# Patient Record
Sex: FEMALE | Race: WHITE | NOT HISPANIC OR LATINO | URBAN - METROPOLITAN AREA
[De-identification: names, ages, dates, MRNs, and addresses within clinical notes are randomized per-mention and may not be internally consistent; named-entity substitution may affect disease eponyms.]

---

## 2017-05-31 ENCOUNTER — OUTPATIENT (OUTPATIENT)
Dept: OUTPATIENT SERVICES | Facility: HOSPITAL | Age: 22
LOS: 1 days | Discharge: HOME | End: 2017-05-31

## 2017-06-28 DIAGNOSIS — E04.9 NONTOXIC GOITER, UNSPECIFIED: ICD-10-CM

## 2019-08-05 ENCOUNTER — EMERGENCY (EMERGENCY)
Facility: HOSPITAL | Age: 24
LOS: 0 days | Discharge: HOME | End: 2019-08-05
Attending: EMERGENCY MEDICINE | Admitting: EMERGENCY MEDICINE
Payer: COMMERCIAL

## 2019-08-05 VITALS
SYSTOLIC BLOOD PRESSURE: 131 MMHG | RESPIRATION RATE: 18 BRPM | HEART RATE: 117 BPM | TEMPERATURE: 98 F | OXYGEN SATURATION: 100 % | DIASTOLIC BLOOD PRESSURE: 94 MMHG | WEIGHT: 169.98 LBS

## 2019-08-05 VITALS
OXYGEN SATURATION: 100 % | SYSTOLIC BLOOD PRESSURE: 122 MMHG | DIASTOLIC BLOOD PRESSURE: 85 MMHG | RESPIRATION RATE: 17 BRPM | HEART RATE: 91 BPM

## 2019-08-05 DIAGNOSIS — Z88.8 ALLERGY STATUS TO OTHER DRUGS, MEDICAMENTS AND BIOLOGICAL SUBSTANCES STATUS: ICD-10-CM

## 2019-08-05 DIAGNOSIS — R10.9 UNSPECIFIED ABDOMINAL PAIN: ICD-10-CM

## 2019-08-05 DIAGNOSIS — R11.0 NAUSEA: ICD-10-CM

## 2019-08-05 DIAGNOSIS — Z88.1 ALLERGY STATUS TO OTHER ANTIBIOTIC AGENTS STATUS: ICD-10-CM

## 2019-08-05 DIAGNOSIS — R10.30 LOWER ABDOMINAL PAIN, UNSPECIFIED: ICD-10-CM

## 2019-08-05 LAB
ALBUMIN SERPL ELPH-MCNC: 4.9 G/DL — SIGNIFICANT CHANGE UP (ref 3.5–5.2)
ALP SERPL-CCNC: 46 U/L — SIGNIFICANT CHANGE UP (ref 30–115)
ALT FLD-CCNC: 14 U/L — SIGNIFICANT CHANGE UP (ref 0–41)
ANION GAP SERPL CALC-SCNC: 17 MMOL/L — HIGH (ref 7–14)
APPEARANCE UR: CLEAR — SIGNIFICANT CHANGE UP
AST SERPL-CCNC: 15 U/L — SIGNIFICANT CHANGE UP (ref 0–41)
BACTERIA # UR AUTO: ABNORMAL
BASOPHILS # BLD AUTO: 0.07 K/UL — SIGNIFICANT CHANGE UP (ref 0–0.2)
BASOPHILS NFR BLD AUTO: 1 % — SIGNIFICANT CHANGE UP (ref 0–1)
BILIRUB DIRECT SERPL-MCNC: <0.2 MG/DL — SIGNIFICANT CHANGE UP (ref 0–0.2)
BILIRUB INDIRECT FLD-MCNC: >0.4 MG/DL — SIGNIFICANT CHANGE UP (ref 0.2–1.2)
BILIRUB SERPL-MCNC: 0.6 MG/DL — SIGNIFICANT CHANGE UP (ref 0.2–1.2)
BILIRUB UR-MCNC: NEGATIVE — SIGNIFICANT CHANGE UP
BUN SERPL-MCNC: 11 MG/DL — SIGNIFICANT CHANGE UP (ref 10–20)
CALCIUM SERPL-MCNC: 10.5 MG/DL — HIGH (ref 8.5–10.1)
CHLORIDE SERPL-SCNC: 100 MMOL/L — SIGNIFICANT CHANGE UP (ref 98–110)
CO2 SERPL-SCNC: 22 MMOL/L — SIGNIFICANT CHANGE UP (ref 17–32)
COLOR SPEC: YELLOW — SIGNIFICANT CHANGE UP
CREAT SERPL-MCNC: 0.8 MG/DL — SIGNIFICANT CHANGE UP (ref 0.7–1.5)
DIFF PNL FLD: ABNORMAL
EOSINOPHIL # BLD AUTO: 0.18 K/UL — SIGNIFICANT CHANGE UP (ref 0–0.7)
EOSINOPHIL NFR BLD AUTO: 2.6 % — SIGNIFICANT CHANGE UP (ref 0–8)
EPI CELLS # UR: ABNORMAL /HPF
GLUCOSE SERPL-MCNC: 81 MG/DL — SIGNIFICANT CHANGE UP (ref 70–99)
GLUCOSE UR QL: NEGATIVE MG/DL — SIGNIFICANT CHANGE UP
HCT VFR BLD CALC: 43.8 % — SIGNIFICANT CHANGE UP (ref 37–47)
HGB BLD-MCNC: 14.6 G/DL — SIGNIFICANT CHANGE UP (ref 12–16)
IMM GRANULOCYTES NFR BLD AUTO: 0.1 % — SIGNIFICANT CHANGE UP (ref 0.1–0.3)
KETONES UR-MCNC: 160
LACTATE SERPL-SCNC: 1.3 MMOL/L — SIGNIFICANT CHANGE UP (ref 0.5–2.2)
LEUKOCYTE ESTERASE UR-ACNC: ABNORMAL
LIDOCAIN IGE QN: 62 U/L — HIGH (ref 7–60)
LYMPHOCYTES # BLD AUTO: 2.22 K/UL — SIGNIFICANT CHANGE UP (ref 1.2–3.4)
LYMPHOCYTES # BLD AUTO: 32.4 % — SIGNIFICANT CHANGE UP (ref 20.5–51.1)
MCHC RBC-ENTMCNC: 29.6 PG — SIGNIFICANT CHANGE UP (ref 27–31)
MCHC RBC-ENTMCNC: 33.3 G/DL — SIGNIFICANT CHANGE UP (ref 32–37)
MCV RBC AUTO: 88.7 FL — SIGNIFICANT CHANGE UP (ref 81–99)
MONOCYTES # BLD AUTO: 0.47 K/UL — SIGNIFICANT CHANGE UP (ref 0.1–0.6)
MONOCYTES NFR BLD AUTO: 6.9 % — SIGNIFICANT CHANGE UP (ref 1.7–9.3)
NEUTROPHILS # BLD AUTO: 3.91 K/UL — SIGNIFICANT CHANGE UP (ref 1.4–6.5)
NEUTROPHILS NFR BLD AUTO: 57 % — SIGNIFICANT CHANGE UP (ref 42.2–75.2)
NITRITE UR-MCNC: NEGATIVE — SIGNIFICANT CHANGE UP
NRBC # BLD: 0 /100 WBCS — SIGNIFICANT CHANGE UP (ref 0–0)
PH UR: 6 — SIGNIFICANT CHANGE UP (ref 5–8)
PLATELET # BLD AUTO: 305 K/UL — SIGNIFICANT CHANGE UP (ref 130–400)
POTASSIUM SERPL-MCNC: 4.3 MMOL/L — SIGNIFICANT CHANGE UP (ref 3.5–5)
POTASSIUM SERPL-SCNC: 4.3 MMOL/L — SIGNIFICANT CHANGE UP (ref 3.5–5)
PROT SERPL-MCNC: 7.5 G/DL — SIGNIFICANT CHANGE UP (ref 6–8)
PROT UR-MCNC: NEGATIVE MG/DL — SIGNIFICANT CHANGE UP
RBC # BLD: 4.94 M/UL — SIGNIFICANT CHANGE UP (ref 4.2–5.4)
RBC # FLD: 12.5 % — SIGNIFICANT CHANGE UP (ref 11.5–14.5)
SODIUM SERPL-SCNC: 139 MMOL/L — SIGNIFICANT CHANGE UP (ref 135–146)
SP GR SPEC: 1.01 — SIGNIFICANT CHANGE UP (ref 1.01–1.03)
UROBILINOGEN FLD QL: 0.2 MG/DL — SIGNIFICANT CHANGE UP (ref 0.2–0.2)
WBC # BLD: 6.86 K/UL — SIGNIFICANT CHANGE UP (ref 4.8–10.8)
WBC # FLD AUTO: 6.86 K/UL — SIGNIFICANT CHANGE UP (ref 4.8–10.8)
WBC UR QL: SIGNIFICANT CHANGE UP /HPF

## 2019-08-05 PROCEDURE — 99283 EMERGENCY DEPT VISIT LOW MDM: CPT

## 2019-08-05 PROCEDURE — 76830 TRANSVAGINAL US NON-OB: CPT | Mod: 26

## 2019-08-05 PROCEDURE — 99284 EMERGENCY DEPT VISIT MOD MDM: CPT

## 2019-08-05 PROCEDURE — 74177 CT ABD & PELVIS W/CONTRAST: CPT | Mod: 26

## 2019-08-05 RX ORDER — ONDANSETRON 8 MG/1
4 TABLET, FILM COATED ORAL ONCE
Refills: 0 | Status: COMPLETED | OUTPATIENT
Start: 2019-08-05 | End: 2019-08-05

## 2019-08-05 RX ORDER — IOHEXOL 300 MG/ML
30 INJECTION, SOLUTION INTRAVENOUS ONCE
Refills: 0 | Status: COMPLETED | OUTPATIENT
Start: 2019-08-05 | End: 2019-08-05

## 2019-08-05 RX ORDER — SODIUM CHLORIDE 9 MG/ML
1000 INJECTION, SOLUTION INTRAVENOUS ONCE
Refills: 0 | Status: COMPLETED | OUTPATIENT
Start: 2019-08-05 | End: 2019-08-05

## 2019-08-05 RX ADMIN — SODIUM CHLORIDE 1000 MILLILITER(S): 9 INJECTION, SOLUTION INTRAVENOUS at 15:02

## 2019-08-05 RX ADMIN — ONDANSETRON 4 MILLIGRAM(S): 8 TABLET, FILM COATED ORAL at 15:02

## 2019-08-05 RX ADMIN — IOHEXOL 30 MILLILITER(S): 300 INJECTION, SOLUTION INTRAVENOUS at 15:03

## 2019-08-05 NOTE — CONSULT NOTE ADULT - ATTENDING COMMENTS
Patient seen and examined with surgery team in ED and discussed management plans with patient and her mother by bedside. sono and CT reviewed and patient advised to go home . does not require hospitalization for observation now as no change for last 3 days

## 2019-08-05 NOTE — ED PROVIDER NOTE - PHYSICAL EXAMINATION
VITAL SIGNS: I have reviewed nursing notes and confirm.  CONSTITUTIONAL: Well-developed; well-nourished; in no acute distress.  SKIN: Skin exam is warm and dry, no acute rash.  HEAD: Normocephalic; atraumatic.  EYES: PERRL, EOM intact; conjunctiva and sclera clear.  ENT: No nasal discharge; airway clear.   NECK: Supple; non tender.  CARD: S1, S2 normal; no murmurs, gallops, or rubs. Regular rate and rhythm.  RESP: Clear to auscultation bilaterally. No wheezes, rales or rhonchi.  ABD: Normal bowel sounds; soft; non-distended; +lower abdominal tenderness. No rebound tenderness or guarding.   EXT: Normal ROM. No edema.  LYMPH: No acute cervical adenopathy.  NEURO: Alert, oriented. Grossly unremarkable. No focal deficits.  PSYCH: Cooperative, appropriate.

## 2019-08-05 NOTE — ED PROVIDER NOTE - OBJECTIVE STATEMENT
25 yo F with pmhx of insulin resistance, PCOS on OCP use presenting with intermittent abdominal pain x 3 days. States pain worse at its worse on Saturday describes it as a feeling as if she ate lot. Symptoms are moderate. Better with rest and worse with movement. Reports associated nausea but no vomiting. Last sexually active last year. No vaginal discharge. States she has not gotten a period since 2 yo ago due to being on oral contraceptives. Reports some vaginal spotting on Sunday. No cp, sob, fever, chills, vomiting, diarrhea, back pain, urinary symptoms, headache, dizziness, paresthesias, or weakness. No prior abdominal surgeries.

## 2019-08-05 NOTE — ED PROVIDER NOTE - PROGRESS NOTE DETAILS
I was directly involved in the management of this patient. Case was discussed with PA fellow and I agree with fellow's plan. ATTENDING NOTE:   25 y/o F p/w RLQ x 2 days.  (+)RLQ TTP   CT labs reassess. Surgery consulted. Pt seen by surgery resident - awaiting Dr. Gant. Patient seen by surgeon Dr. Gant bedside recommending dc home with no antibiotics required and Gyn follow up.

## 2019-08-05 NOTE — ED PROVIDER NOTE - NS ED ROS FT
Review of Systems:  	•	CONSTITUTIONAL - no fever, no diaphoresis, no chills  	•	SKIN - no rash  	•	HEMATOLOGIC - no bleeding, no bruising  	•	EYES - no eye pain, no blurry vision  	•	ENT - no congestion  	•	RESPIRATORY - no shortness of breath, no cough  	•	CARDIAC - no chest pain, no palpitations  	•	GI - + abd pain, + nausea, no vomiting, no diarrhea, no constipation  	•	GENITO-URINARY/GYN - +vaginal spotting, no dysuria; no hematuria, no increased urinary frequency  	•	MUSCULOSKELETAL - no joint paint, no swelling, no redness  	•	NEUROLOGIC - no weakness, no headache, no paresthesias, no LOC  	•	PSYCH - no anxiety, non suicidal, non homicidal, no hallucination, no depression  	All other ROS are negative except as documented in HPI.

## 2019-08-05 NOTE — CONSULT NOTE ADULT - SUBJECTIVE AND OBJECTIVE BOX
General Surgery Consultation Note  =====================================================  HPI: 24y Female PMH hypercoaguability, PCOS, and below complains of waking with abdominal discomfort 8/3, across her lower abdomen with a bloating sensation and mild nausea, with T 99.           PAST MEDICAL & SURGICAL HISTORY:  heart murmur  insulin resistance  PCOS on progesterone only OCP  ? 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  [ ] Due to altered mental status/intubation, subjective information were not able to be obtained from the patient. History was obtained, to the extent possible, from review of the chart and collateral sources of information.      CURRENT MEDICATIONS:   --------------------------------------------------------------------------------------  Neurologic Medications    Respiratory Medications    Cardiovascular Medications    Gastrointestinal Medications    Genitourinary Medications    Hematologic/Oncologic Medications    Antimicrobial/Immunologic Medications    Endocrine/Metabolic Medications    Topical/Other Medications    --------------------------------------------------------------------------------------    VITAL SIGNS, INS/OUTS (last 24 hours):  --------------------------------------------------------------------------------------  ICU Vital Signs Last 24 Hrs  T(C): 36.7 (05 Aug 2019 13:16), Max: 36.7 (05 Aug 2019 13:16)  T(F): 98 (05 Aug 2019 13:16), Max: 98 (05 Aug 2019 13:16)  HR: 117 (05 Aug 2019 13:16) (117 - 117)  BP: 131/94 (05 Aug 2019 13:16) (131/94 - 131/94)  BP(mean): --  ABP: --  ABP(mean): --  RR: 18 (05 Aug 2019 13:16) (18 - 18)  SpO2: 100% (05 Aug 2019 13:16) (100% - 100%)    I&O's Summary    --------------------------------------------------------------------------------------  PHYSICAL EXAM    General: NAD, AAOx3, calm and cooperative  HEENT: NCAT, CEDRIC, EOMI, Trachea ML, Neck supple  Cardiac: RRR S1, S2, no Murmurs, rubs or gallops  Respiratory: CTAB, normal respiratory effort, breath sounds equal BL, no wheeze, rhonchi or crackles  Abdomen: Soft, non-distended, non-tender, +bowel sounds  Musculoskeletal: Strength 5/5 BL UE/LE, ROM intact, compartments soft  Neuro: Sensation grossly intact and equal throughout, CN II-XII intact, no focal deficits  Vascular: Pulses 2+ throughout, extremities well perfused  Skin: Warm/dry, normal color, no jaundice  Incision/wound: healing well, dressings in place, clean, dry and intact    LABS  --------------------------------------------------------------------------------------  Labs:  CAPILLARY BLOOD GLUCOSE                              14.6   6.86  )-----------( 305      ( 05 Aug 2019 13:50 )             43.8       Auto Neutrophil %: 57.0 % (19 @ 13:50)  Auto Immature Granulocyte %: 0.1 % (19 @ 13:50)        139  |  100  |  11  ----------------------------<  81  4.3   |  22  |  0.8      Calcium, Total Serum: 10.5 mg/dL (19 @ 13:50)      LFTs:             7.5  | 0.6  | 15       ------------------[46      ( 05 Aug 2019 13:50 )  4.9  | <0.2 | 14          Lipase:62     Amylase:x         Lactate, Blood: 1.3 mmol/L (19 @ 13:50)      Coags:            Urinalysis Basic - ( 05 Aug 2019 13:50 )    Color: Yellow / Appearance: Clear / S.010 / pH: x  Gluc: x / Ketone: 160  / Bili: Negative / Urobili: 0.2 mg/dL   Blood: x / Protein: Negative mg/dL / Nitrite: Negative   Leuk Esterase: Small / RBC: x / WBC 3-5 /HPF   Sq Epi: x / Non Sq Epi: Few /HPF / Bacteria: Few          --------------------------------------------------------------------------------------    OTHER LABS    IMAGING RESULTS    ---------------------------------------------------------------------------------------    ASSESSMENT:  24y Female ***    PLAN:       --------------------------------------------------------------------------------------    19 @ 19:24 General Surgery Consultation Note  =====================================================  HPI: 24y Female PMH hypercoaguability, PCOS, and below complains of waking with abdominal discomfort 8/3, across her lower abdomen with a bloating sensation and mild nausea, with T99. She felt tenderness in the RLQ more than the left, which improved  after she started having vaginal spotting. She has not had menses for two years.  She continued to have bloating and low appetite, but slight better discomfort yesterday but remained tender. She went to her PMD Dr. Guerra today, feeling improved but wanted to be evaluated, and her PMD sent her to the ER. In the ED, she states she has mild nausea, a diminished but present appetite, no emesis, no diarrhea, passing flatus and last BM yesterday normal consistency. She states this pain is different than a previous ruptured ovarian cyst or other illnesses she had.  She last ate earlier today.  She has not taken any pain medication, and has been tolerating PO and keeping hydrated.   CT found known cholelithiasis, dilated appendix without inflammation or collection, and US found possible adenomyosis. She is afebrile, and WBC 6.86, UA with ketones.         PAST MEDICAL & SURGICAL HISTORY:  heart murmur  insulin resistance  PCOS on progesterone only OCP  ? 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  multiple severe allergies  asymptomatic cholelithiasis  thyroid nodules  central vestibular dysfunction  hypercoaguability (mutations MTHFR, TAM 1 4G/5G, C677T and Q0347P)  Denies any prior surgeries     Home Medications:  progesterone only oral contraceptive  aspirin 81mg QD  Vitamin D, Vitamin C  thyrotropin  Claritin  B12       Allergies:   Cipro (Unknown)  clindamycin (Unknown)  english plantain (Unknown)  Gluten (Unknown)  levofloxacin (Unknown)  Mushrooms (Unknown)  Originally Entered as [EYE SWELLING] reaction to [Seasonal allergies] (Unknown)  peanuts (Unknown)  shrimp (Unknown)  Zithromax (Unknown)  Menactra vaccine      ROS:    REVIEW OF SYSTEMS    [x ] A ten-point review of systems was otherwise negative except as noted.  [ ] Due to altered mental status/intubation, subjective information were not able to be obtained from the patient. History was obtained, to the extent possible, from review of the chart and collateral sources of information.      CURRENT MEDICATIONS:   --------------------------------------------------------------------------------------  Neurologic Medications    Respiratory Medications    Cardiovascular Medications    Gastrointestinal Medications    Genitourinary Medications    Hematologic/Oncologic Medications    Antimicrobial/Immunologic Medications    Endocrine/Metabolic Medications    Topical/Other Medications    --------------------------------------------------------------------------------------    VITAL SIGNS, INS/OUTS (last 24 hours):  --------------------------------------------------------------------------------------  ICU Vital Signs Last 24 Hrs  T(C): 36.7 (05 Aug 2019 13:16), Max: 36.7 (05 Aug 2019 13:16)  T(F): 98 (05 Aug 2019 13:16), Max: 98 (05 Aug 2019 13:16)  HR: 117 (05 Aug 2019 13:16) (117 - 117)  BP: 131/94 (05 Aug 2019 13:16) (131/94 - 131/94)  BP(mean): --  ABP: --  ABP(mean): --  RR: 18 (05 Aug 2019 13:16) (18 - 18)  SpO2: 100% (05 Aug 2019 13:16) (100% - 100%)    I&O's Summary    --------------------------------------------------------------------------------------  PHYSICAL EXAM    General: NAD, AAOx3, speaking in full sentences and cooperative. Appropriate affect. Sitting on bed unaided with mother at bedside.   Cardiac: RRR  with no murmurs, rubs or gallops  Respiratory: No increased work of breathing or adventitious sounds. God aeration.  On room air.   Abdomen: Soft, nondistended. No tympany, rebound, or guarding. Mild tenderness to palpation to the right of umbilicus.  Normal bowel sounds. No rovsin, psoas, or obturator signs. No peritoneal signs   Musculoskeletal: Strength 5/5 BL UE/LE, ROM intact- able to lift legs off of bed and hold against resistance.   Vascular: extremities well perfused  Skin: Warm/dry, normal color, no jaundice    LABS  --------------------------------------------------------------------------------------  Labs:  CAPILLARY BLOOD GLUCOSE                        14.6   6.86  )-----------( 305      ( 05 Aug 2019 13:50 )             43.8       Auto Neutrophil %: 57.0 % (19 @ 13:50)  Auto Immature Granulocyte %: 0.1 % (19 @ 13:50)        139  |  100  |  11  ----------------------------<  81  4.3   |  22  |  0.8      Calcium, Total Serum: 10.5 mg/dL (19 @ 13:50)      LFTs:             7.5  | 0.6  | 15       ------------------[46      ( 05 Aug 2019 13:50 )  4.9  | <0.2 | 14          Lipase:62     Amylase:x         Lactate, Blood: 1.3 mmol/L (19 @ 13:50)      Coags:            Urinalysis Basic - ( 05 Aug 2019 13:50 )    Color: Yellow / Appearance: Clear / S.010 / pH: x  Gluc: x / Ketone: 160  / Bili: Negative / Urobili: 0.2 mg/dL   Blood: x / Protein: Negative mg/dL / Nitrite: Negative   Leuk Esterase: Small / RBC: x / WBC 3-5 /HPF   Sq Epi: x / Non Sq Epi: Few /HPF / Bacteria: Few          --------------------------------------------------------------------------------------    OTHER LABS    IMAGING RESULTS  < from: US Transvaginal (19 @ 14:04) >  PROCEDURE: Transabdominal and transvaginal ultrasound of the pelvis was   performed, including Doppler.    LMP: Approximately 2 years ago, on OCP    FINDINGS:    UTERUS: Anteverted and mildly enlarged measuring 6.7 x 5.5 x 3.4 cm, with   heterogeneous echotexture. The endometrial echo complex measures 0.8 cm,   which is normal in thickness.     RIGHT OVARY: measures 3.8 x 2.5 x 2.1 cm, and is unremarkable. Doppler   flow is demonstrated to the right ovary.     LEFT OVARY: measures 3.8 x 2.5 x 1.9 cm, and is unremarkable. Doppler   flow is demonstrated to the left ovary.     OTHER: No free fluid in the pelvis.    IMPRESSION:    1.  Unremarkable sonographic appearance of the ovaries.  2.  Mildly enlarged uterus with heterogeneous myometrium, nonspecific,   and may represent underlying adenomyosis.    < end of copied text >      < from: CT Abdomen and Pelvis w/ Oral Cont and w/ IV Cont (19 @ 17:16) >  FINDINGS:    LOWER CHEST: Unremarkable.    HEPATOBILIARY: Cholelithiasis.    SPLEEN: Unremarkable.    PANCREAS: Unremarkable.    ADRENAL GLANDS: Unremarkable.    KIDNEYS: Symmetric renal enhancement. No hydronephrosis.    ABDOMINOPELVIC NODES: No lymphadenopathy.    PELVIC ORGANS: Unremarkable.    PERITONEUM/MESENTERY/BOWEL: The appendix is mildly dilated measuring up   to 8 mm and is without luminal oral contrast. No evidence of surrounding   inflammation or abscess or appendicolith. Findings are equivocal for   early acute appendicitis. No bowel obstruction or pneumoperitoneum.    BONES/SOFT TISSUES: No acute osseous abnormality.    OTHER: Normal caliber aorta.      IMPRESSION:     Prominent and unopacified appendix measuring up to 8 mm, absence of   surrounding inflammation:  equivocal for early acute appendicitis.   Correlate with clinical findings.    Cholelithiasis.    < end of copied text > General Surgery Consultation Note  =====================================================  HPI: 24y Female PMH hypercoaguability, PCOS, complains of waking with abdominal discomfort 8/3, across her lower abdomen with a bloating sensation and mild nausea, with T99. She felt tenderness in the RLQ more than the left, which improved  after she started having vaginal spotting. She has not had menses for two years.  She continued to have bloating and low appetite, but slightly better discomfort yesterday but remained tender. She went to her PMD Dr. Guerra today, feeling improved but wanted to be evaluated, and her PMD sent her to the ER. In the ED, she states she has mild nausea, a diminished but present appetite, no emesis, no diarrhea, passing flatus and last BM yesterday normal consistency. She states this pain is different than a previous ruptured ovarian cyst or other illnesses she had.  She last ate earlier today.  She has not taken any pain medication, and has been tolerating PO and keeping hydrated.   CT found known cholelithiasis, dilated appendix without inflammation or collection, and US found possible adenomyosis. She is afebrile, and WBC 6.86, UA with ketones.         PAST MEDICAL & SURGICAL HISTORY:  heart murmur  insulin resistance  PCOS on progesterone only OCP  ? 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  multiple severe allergies  asymptomatic cholelithiasis  thyroid nodules  central vestibular dysfunction  hypercoaguability (mutations MTHFR, TAM 1 4G/5G, C677T and [id number])  Denies any prior surgeries     Home Medications:  progesterone only oral contraceptive  aspirin 81mg QD  Vitamin D, Vitamin C  thyrotropin  Claritin  B12       Allergies:   Cipro (Unknown)  clindamycin (Unknown)  english plantain (Unknown)  Gluten (Unknown)  levofloxacin (Unknown)  Mushrooms (Unknown)  Originally Entered as [EYE SWELLING] reaction to [Seasonal allergies] (Unknown)  peanuts (Unknown)  shrimp (Unknown)  Zithromax (Unknown)  Menactra vaccine      ROS:    REVIEW OF SYSTEMS    [x ] A ten-point review of systems was otherwise negative except as noted.  [ ] Due to altered mental status/intubation, subjective information were not able to be obtained from the patient. History was obtained, to the extent possible, from review of the chart and collateral sources of information.    --------------------------------------------------------------------------------------    VITAL SIGNS, INS/OUTS (last 24 hours):  --------------------------------------------------------------------------------------  ICU Vital Signs Last 24 Hrs  T(C): 36.7 (05 Aug 2019 13:16), Max: 36.7 (05 Aug 2019 13:16)  T(F): 98 (05 Aug 2019 13:16), Max: 98 (05 Aug 2019 13:16)  HR: 117 (05 Aug 2019 13:16) (117 - 117)  BP: 131/94 (05 Aug 2019 13:16) (131/94 - 131/94)  RR: 18 (05 Aug 2019 13:16) (18 - 18)  SpO2: 100% (05 Aug 2019 13:16) (100% - 100%)    I&O's Summary    --------------------------------------------------------------------------------------  PHYSICAL EXAM    General: NAD, AAOx3, speaking in full sentences and cooperative. Appropriate affect. Sitting on bed unaided with mother at bedside.   Cardiac: RRR  with no murmurs, rubs or gallops  Respiratory: No increased work of breathing or adventitious sounds. God aeration.  On room air.   Abdomen: Soft, nondistended. No tympany, rebound, or guarding. Mild tenderness to palpation to the right of umbilicus.  Normal bowel sounds. No rovsin, psoas, or obturator signs. No peritoneal signs   Musculoskeletal: Strength 5/5 BL UE/LE, ROM intact- able to lift legs off of bed and hold against resistance.   Vascular: extremities well perfused  Skin: Warm/dry, normal color, no jaundice    LABS  --------------------------------------------------------------------------------------  Labs:  CAPILLARY BLOOD GLUCOSE                        14.6   6.86  )-----------( 305      ( 05 Aug 2019 13:50 )             43.8       Auto Neutrophil %: 57.0 % (19 @ 13:50)  Auto Immature Granulocyte %: 0.1 % (19 @ 13:50)        139  |  100  |  11  ----------------------------<  81  4.3   |  22  |  0.8      Calcium, Total Serum: 10.5 mg/dL (19 @ 13:50)      LFTs:             7.5  | 0.6  | 15       ------------------[46      ( 05 Aug 2019 13:50 )  4.9  | <0.2 | 14          Lipase:62     Amylase:x         Lactate, Blood: 1.3 mmol/L (19 @ 13:50)    Urinalysis Basic - ( 05 Aug 2019 13:50 )    Color: Yellow / Appearance: Clear / S.010 / pH: x  Gluc: x / Ketone: 160  / Bili: Negative / Urobili: 0.2 mg/dL   Blood: x / Protein: Negative mg/dL / Nitrite: Negative   Leuk Esterase: Small / RBC: x / WBC 3-5 /HPF   Sq Epi: x / Non Sq Epi: Few /HPF / Bacteria: Few          --------------------------------------------------------------------------------------    OTHER LABS    IMAGING RESULTS  < from: US Transvaginal (19 @ 14:04) >  PROCEDURE: Transabdominal and transvaginal ultrasound of the pelvis was   performed, including Doppler.    LMP: Approximately 2 years ago, on OCP    FINDINGS:    UTERUS: Anteverted and mildly enlarged measuring 6.7 x 5.5 x 3.4 cm, with   heterogeneous echotexture. The endometrial echo complex measures 0.8 cm,   which is normal in thickness.     RIGHT OVARY: measures 3.8 x 2.5 x 2.1 cm, and is unremarkable. Doppler   flow is demonstrated to the right ovary.     LEFT OVARY: measures 3.8 x 2.5 x 1.9 cm, and is unremarkable. Doppler   flow is demonstrated to the left ovary.     OTHER: No free fluid in the pelvis.    IMPRESSION:    1.  Unremarkable sonographic appearance of the ovaries.  2.  Mildly enlarged uterus with heterogeneous myometrium, nonspecific,   and may represent underlying adenomyosis.    < end of copied text >      < from: CT Abdomen and Pelvis w/ Oral Cont and w/ IV Cont (19 @ 17:16) >  FINDINGS:    LOWER CHEST: Unremarkable.    HEPATOBILIARY: Cholelithiasis.    SPLEEN: Unremarkable.    PANCREAS: Unremarkable.    ADRENAL GLANDS: Unremarkable.    KIDNEYS: Symmetric renal enhancement. No hydronephrosis.    ABDOMINOPELVIC NODES: No lymphadenopathy.    PELVIC ORGANS: Unremarkable.    PERITONEUM/MESENTERY/BOWEL: The appendix is mildly dilated measuring up   to 8 mm and is without luminal oral contrast. No evidence of surrounding   inflammation or abscess or appendicolith. Findings are equivocal for   early acute appendicitis. No bowel obstruction or pneumoperitoneum.    BONES/SOFT TISSUES: No acute osseous abnormality.    OTHER: Normal caliber aorta.      IMPRESSION:     Prominent and unopacified appendix measuring up to 8 mm, absence of   surrounding inflammation:  equivocal for early acute appendicitis.   Correlate with clinical findings.    Cholelithiasis.    < end of copied text >

## 2019-08-05 NOTE — CONSULT NOTE ADULT - ASSESSMENT
25 yo F with PMH above, with abdominal pain, new vaginal bleeding, not consistent with appendicitis at this time, possible early ruptured ovarian cyst vs adenomyosis. At three days of symptoms, would expect tenderness, leukocytosis, fever, and progression without improvement.     - follow up with GYN outpatient for vaginal spotting and possible adenomyosis  - please give copies of radiology reports to patient   May be discharged home from surgical perspective. Discussed warning signs and was instructed if she has any worsening to return for evaluation. She and her mother are in agreement with this plan.   Discussed with ED Attending, patient and her mother. Seen and examined with Dr. Gant 23 yo F with PMH above, with abdominal pain, new vaginal bleeding, not consistent with appendicitis at this time, possible early ruptured ovarian cyst vs adenomyosis. At three days of symptoms, would expect tenderness, leukocytosis, fever, and progression without improvement.     - follow up with GYN outpatient for vaginal spotting and possible adenomyosis  - please give copies of radiology reports to patient   Recommend ibuprofen with meals for pain control as needed.   May be discharged home from surgical perspective. Discussed warning signs and was instructed if she has any worsening to return for evaluation. She and her mother are in agreement with this plan.   Discussed with ED Attending, patient and her mother. Seen and examined with Dr. Gant

## 2019-08-05 NOTE — ED ADULT NURSE NOTE - CHIEF COMPLAINT QUOTE
c/o abd pain since saturday.  sent by [facility] – [place], [place] to r/o appendicitis

## 2019-08-05 NOTE — ED PROVIDER NOTE - CLINICAL SUMMARY MEDICAL DECISION MAKING FREE TEXT BOX
seen and cleared by surgical attending Dr. Gatn, doubts appy based on his read of CT and exam.  return if symptoms return or remain at 12 hours.

## 2019-08-13 ENCOUNTER — APPOINTMENT (OUTPATIENT)
Dept: SURGICAL ONCOLOGY | Facility: CLINIC | Age: 24
End: 2019-08-13
Payer: COMMERCIAL

## 2019-08-13 VITALS
DIASTOLIC BLOOD PRESSURE: 84 MMHG | HEART RATE: 83 BPM | HEIGHT: 70 IN | WEIGHT: 168 LBS | SYSTOLIC BLOOD PRESSURE: 128 MMHG | BODY MASS INDEX: 24.05 KG/M2 | OXYGEN SATURATION: 97 %

## 2019-08-13 DIAGNOSIS — R10.9 UNSPECIFIED ABDOMINAL PAIN: ICD-10-CM

## 2019-08-13 DIAGNOSIS — Z78.9 OTHER SPECIFIED HEALTH STATUS: ICD-10-CM

## 2019-08-13 DIAGNOSIS — Z82.69 FAMILY HISTORY OF OTHER DISEASES OF THE MUSCULOSKELETAL SYSTEM AND CONNECTIVE TISSUE: ICD-10-CM

## 2019-08-13 DIAGNOSIS — Z80.8 FAMILY HISTORY OF MALIGNANT NEOPLASM OF OTHER ORGANS OR SYSTEMS: ICD-10-CM

## 2019-08-13 DIAGNOSIS — Z87.09 PERSONAL HISTORY OF OTHER DISEASES OF THE RESPIRATORY SYSTEM: ICD-10-CM

## 2019-08-13 DIAGNOSIS — Z86.39 PERSONAL HISTORY OF OTHER ENDOCRINE, NUTRITIONAL AND METABOLIC DISEASE: ICD-10-CM

## 2019-08-13 DIAGNOSIS — Z82.49 FAMILY HISTORY OF ISCHEMIC HEART DISEASE AND OTHER DISEASES OF THE CIRCULATORY SYSTEM: ICD-10-CM

## 2019-08-13 DIAGNOSIS — E72.12 METHYLENETETRAHYDROFOLATE REDUCTASE DEFICIENCY: ICD-10-CM

## 2019-08-13 DIAGNOSIS — Z83.49 FAMILY HISTORY OF OTHER ENDOCRINE, NUTRITIONAL AND METABOLIC DISEASES: ICD-10-CM

## 2019-08-13 DIAGNOSIS — Z82.5 FAMILY HISTORY OF ASTHMA AND OTHER CHRONIC LOWER RESPIRATORY DISEASES: ICD-10-CM

## 2019-08-13 DIAGNOSIS — Z80.9 FAMILY HISTORY OF MALIGNANT NEOPLASM, UNSPECIFIED: ICD-10-CM

## 2019-08-13 DIAGNOSIS — E88.02: ICD-10-CM

## 2019-08-13 DIAGNOSIS — Z82.3 FAMILY HISTORY OF STROKE: ICD-10-CM

## 2019-08-13 PROBLEM — Z00.00 ENCOUNTER FOR PREVENTIVE HEALTH EXAMINATION: Status: ACTIVE | Noted: 2019-08-13

## 2019-08-13 PROCEDURE — 99213 OFFICE O/P EST LOW 20 MIN: CPT

## 2019-08-13 PROCEDURE — 99203 OFFICE O/P NEW LOW 30 MIN: CPT

## 2019-08-13 RX ORDER — CHROMIUM 200 MCG
TABLET ORAL
Refills: 0 | Status: ACTIVE | COMMUNITY

## 2019-08-13 RX ORDER — ASPIRIN 81 MG
81 TABLET, DELAYED RELEASE (ENTERIC COATED) ORAL
Refills: 0 | Status: ACTIVE | COMMUNITY

## 2019-08-13 RX ORDER — NORETHINDRONE 0.35 MG/1
TABLET ORAL
Refills: 0 | Status: ACTIVE | COMMUNITY

## 2019-08-13 RX ORDER — THYROID 90 MG/1
TABLET ORAL
Refills: 0 | Status: ACTIVE | COMMUNITY

## 2019-08-13 NOTE — ASSESSMENT
[FreeTextEntry1] : I: resolved appendicitis\par \par p: discussed with patient and mom clinical findings. Likely had early appendicitis which resolved on its own. Also happens to have gallstones but appear to be asymptomatic. Options are to have an interval appendectomy and consider choley at same time versus expectant management. Discussed a lap tatum and choley. Risks and benefits discussed, including but not limited to post bleeding, infection, organ injury . All questions answered.For now will hold off onsurgery. I recommend follow up CT in 4-6 weeks to assure that appendix normalizes.\par \par Cristo Gonsalves MD\par \par Chief Surgical Oncology\par Multidisciplinary GI cancer program\par [facility]\par [facility]\par \par Professor Surgery\par [employer] [facility] [employer]\par \par

## 2019-08-13 NOTE — ASSESSMENT
[FreeTextEntry1] : I: resolved appendicitis\par \par p: discussed with patient and mom clinical findings. Likely had early appendicitis which resolved on its own. Also happens to have gallstones but appear to be asymptomatic. Options are to have an interval appendectomy and consider choley at same time versus expectant management. Discussed a lap tatum and choley. Risks and benefits discussed, including but not limited to post bleeding, infection, organ injury . All questions answered.For now will hold off onsurgery. I recommend follow up CT in 4-6 weeks to assure that appendix normalizes.\par \par Cristo Gonsalves MD\par \par Chief Surgical Oncology\par Multidisciplinary GI cancer program\par [facility]\par [facility]\par \par Professor Surgery\par [facility] [employer]\par \par

## 2019-08-13 NOTE — HISTORY OF PRESENT ILLNESS
[de-identified] : Patient Name: SOPHIE ULLOA   1995 \par [employer] MRN: [id number]  \par Sherif MRN: [id number]\par Referring Provider:none \par Oncologist: n/a\par \par Date of Visit: 2019 \par \par Diagnosis: periumbilical pain\par Operative date: n/a\par Procedure: n/a\par Pathology: n/a\par \par \par \par 24 year old female w acute onset abdominal pain 10 days ago. Started periumbilical and over 24 hours migrated to RLQ. Went to ER and told possibly appendicitis but did not have surgery. Was not treated with antibiotics. Pain resolved over next few days. Had outpatient follow up with gyn MD and raised question of this being GB related. Denies history of upper abdominal or RUQ pain or biliary symptoms. Here for evaluation. Currently no pain, fever. Appetite pretty good.  No RUQ pain. No h/o jaundice

## 2019-08-13 NOTE — PHYSICAL EXAM
[Normal] : supple, no neck mass and thyroid not enlarged [Normal Neck Lymph Nodes] : normal neck lymph nodes  [Normal Groin Lymph Nodes] : normal groin lymph nodes [Normal Supraclavicular Lymph Nodes] : normal supraclavicular lymph nodes [Normal Axillary Lymph Nodes] : normal axillary lymph nodes [de-identified] : no abdominal pain  [Normal] : oriented to person, place and time, with appropriate affect [FreeTextEntry1] : deferred [de-identified] : deferred

## 2019-08-13 NOTE — RESULTS/DATA
[FreeTextEntry1] : CT a/p 8/5/2019 (full report scanned to chart outside record)\par \par I: cholelithiasis, equivocal for appendicitis

## 2019-08-13 NOTE — HISTORY OF PRESENT ILLNESS
[de-identified] : Patient Name: SOPHIE ULLOA   1995 \par [employer] MRN: [id number]  \par Sherif MRN: [id number]\par Referring Provider:none \par Oncologist: n/a\par \par Date of Visit: 2019 \par \par Diagnosis: periumbilical pain\par Operative date: n/a\par Procedure: n/a\par Pathology: n/a\par \par \par \par 24 year old female w acute onset abdominal pain 10 days ago. Started periumbilical and over 24 hours migrated to RLQ. Went to ER and told possibly appendicitis but did not have surgery. Was not treated with antibiotics. Pain resolved over next few days. Had outpatient follow up with gyn MD and raised question of this being GB related. Denies history of upper abdominal or RUQ pain or biliary symptoms. Here for evaluation. Currently no pain, fever. Appetite pretty good.  No RUQ pain. No h/o jaundice

## 2019-08-13 NOTE — REASON FOR VISIT
[Initial Consultation] : an initial consultation for [Parent] : parent [FreeTextEntry2] : abdominal pain

## 2019-08-13 NOTE — PHYSICAL EXAM
[Normal] : supple, no neck mass and thyroid not enlarged [Normal Neck Lymph Nodes] : normal neck lymph nodes  [Normal Supraclavicular Lymph Nodes] : normal supraclavicular lymph nodes [Normal Groin Lymph Nodes] : normal groin lymph nodes [Normal Axillary Lymph Nodes] : normal axillary lymph nodes [de-identified] : no abdominal pain  [Normal] : oriented to person, place and time, with appropriate affect [FreeTextEntry1] : deferred [de-identified] : deferred

## 2019-08-28 ENCOUNTER — TRANSCRIPTION ENCOUNTER (OUTPATIENT)
Age: 24
End: 2019-08-28

## 2019-09-26 ENCOUNTER — TRANSCRIPTION ENCOUNTER (OUTPATIENT)
Age: 24
End: 2019-09-26

## 2019-10-09 ENCOUNTER — RESULT REVIEW (OUTPATIENT)
Age: 24
End: 2019-10-09

## 2019-10-09 DIAGNOSIS — Z01.818 ENCOUNTER FOR OTHER PREPROCEDURAL EXAMINATION: ICD-10-CM

## 2019-10-10 ENCOUNTER — INPATIENT (INPATIENT)
Facility: HOSPITAL | Age: 24
LOS: 0 days | Discharge: HOME | DRG: 340 | End: 2019-10-11
Attending: SURGERY | Admitting: SURGERY
Payer: COMMERCIAL

## 2019-10-10 VITALS
OXYGEN SATURATION: 100 % | DIASTOLIC BLOOD PRESSURE: 84 MMHG | WEIGHT: 158.07 LBS | TEMPERATURE: 98 F | HEART RATE: 82 BPM | SYSTOLIC BLOOD PRESSURE: 129 MMHG | RESPIRATION RATE: 15 BRPM | HEIGHT: 70 IN

## 2019-10-10 DIAGNOSIS — E88.81 METABOLIC SYNDROME AND OTHER INSULIN RESISTANCE: ICD-10-CM

## 2019-10-10 DIAGNOSIS — K35.33 ACUTE APPENDICITIS WITH PERFORATION, LOCALIZED PERITONITIS, AND GANGRENE, WITH ABSCESS: ICD-10-CM

## 2019-10-10 LAB
ANION GAP SERPL CALC-SCNC: 11 MMOL/L — SIGNIFICANT CHANGE UP (ref 5–17)
APTT BLD: 26.3 SEC — LOW (ref 27.5–36.3)
BASOPHILS # BLD AUTO: 0.06 K/UL — SIGNIFICANT CHANGE UP (ref 0–0.2)
BASOPHILS NFR BLD AUTO: 1.1 % — SIGNIFICANT CHANGE UP (ref 0–2)
BLD GP AB SCN SERPL QL: NEGATIVE — SIGNIFICANT CHANGE UP
BUN SERPL-MCNC: 13 MG/DL — SIGNIFICANT CHANGE UP (ref 7–23)
CALCIUM SERPL-MCNC: 9.7 MG/DL — SIGNIFICANT CHANGE UP (ref 8.4–10.5)
CHLORIDE SERPL-SCNC: 103 MMOL/L — SIGNIFICANT CHANGE UP (ref 96–108)
CO2 SERPL-SCNC: 23 MMOL/L — SIGNIFICANT CHANGE UP (ref 22–31)
CREAT SERPL-MCNC: 0.77 MG/DL — SIGNIFICANT CHANGE UP (ref 0.5–1.3)
EOSINOPHIL # BLD AUTO: 0.16 K/UL — SIGNIFICANT CHANGE UP (ref 0–0.5)
EOSINOPHIL NFR BLD AUTO: 2.9 % — SIGNIFICANT CHANGE UP (ref 0–6)
GLUCOSE SERPL-MCNC: 77 MG/DL — SIGNIFICANT CHANGE UP (ref 70–99)
HCG SERPL-ACNC: <0 MIU/ML — SIGNIFICANT CHANGE UP
HCT VFR BLD CALC: 40.8 % — SIGNIFICANT CHANGE UP (ref 34.5–45)
HGB BLD-MCNC: 13.4 G/DL — SIGNIFICANT CHANGE UP (ref 11.5–15.5)
IMM GRANULOCYTES NFR BLD AUTO: 0 % — SIGNIFICANT CHANGE UP (ref 0–1.5)
INR BLD: 1.06 — SIGNIFICANT CHANGE UP (ref 0.88–1.16)
LYMPHOCYTES # BLD AUTO: 2.79 K/UL — SIGNIFICANT CHANGE UP (ref 1–3.3)
LYMPHOCYTES # BLD AUTO: 49.9 % — HIGH (ref 13–44)
MCHC RBC-ENTMCNC: 29.9 PG — SIGNIFICANT CHANGE UP (ref 27–34)
MCHC RBC-ENTMCNC: 32.8 GM/DL — SIGNIFICANT CHANGE UP (ref 32–36)
MCV RBC AUTO: 91.1 FL — SIGNIFICANT CHANGE UP (ref 80–100)
MONOCYTES # BLD AUTO: 0.42 K/UL — SIGNIFICANT CHANGE UP (ref 0–0.9)
MONOCYTES NFR BLD AUTO: 7.5 % — SIGNIFICANT CHANGE UP (ref 2–14)
NEUTROPHILS # BLD AUTO: 2.16 K/UL — SIGNIFICANT CHANGE UP (ref 1.8–7.4)
NEUTROPHILS NFR BLD AUTO: 38.6 % — LOW (ref 43–77)
NRBC # BLD: 0 /100 WBCS — SIGNIFICANT CHANGE UP (ref 0–0)
PLATELET # BLD AUTO: 292 K/UL — SIGNIFICANT CHANGE UP (ref 150–400)
POTASSIUM SERPL-MCNC: 4.4 MMOL/L — SIGNIFICANT CHANGE UP (ref 3.5–5.3)
POTASSIUM SERPL-SCNC: 4.4 MMOL/L — SIGNIFICANT CHANGE UP (ref 3.5–5.3)
PROTHROM AB SERPL-ACNC: 12 SEC — SIGNIFICANT CHANGE UP (ref 10–12.9)
RBC # BLD: 4.48 M/UL — SIGNIFICANT CHANGE UP (ref 3.8–5.2)
RBC # FLD: 12.5 % — SIGNIFICANT CHANGE UP (ref 10.3–14.5)
RH IG SCN BLD-IMP: POSITIVE — SIGNIFICANT CHANGE UP
SODIUM SERPL-SCNC: 137 MMOL/L — SIGNIFICANT CHANGE UP (ref 135–145)
WBC # BLD: 5.59 K/UL — SIGNIFICANT CHANGE UP (ref 3.8–10.5)
WBC # FLD AUTO: 5.59 K/UL — SIGNIFICANT CHANGE UP (ref 3.8–10.5)

## 2019-10-10 PROCEDURE — 99285 EMERGENCY DEPT VISIT HI MDM: CPT

## 2019-10-10 RX ORDER — METRONIDAZOLE 500 MG
500 TABLET ORAL EVERY 8 HOURS
Refills: 0 | Status: DISCONTINUED | OUTPATIENT
Start: 2019-10-10 | End: 2019-10-11

## 2019-10-10 RX ORDER — ONDANSETRON 8 MG/1
4 TABLET, FILM COATED ORAL EVERY 6 HOURS
Refills: 0 | Status: DISCONTINUED | OUTPATIENT
Start: 2019-10-10 | End: 2019-10-11

## 2019-10-10 RX ORDER — SODIUM CHLORIDE 9 MG/ML
1000 INJECTION, SOLUTION INTRAVENOUS
Refills: 0 | Status: DISCONTINUED | OUTPATIENT
Start: 2019-10-10 | End: 2019-10-11

## 2019-10-10 RX ORDER — SODIUM CHLORIDE 9 MG/ML
1000 INJECTION INTRAMUSCULAR; INTRAVENOUS; SUBCUTANEOUS ONCE
Refills: 0 | Status: COMPLETED | OUTPATIENT
Start: 2019-10-10 | End: 2019-10-10

## 2019-10-10 RX ORDER — HYDROMORPHONE HYDROCHLORIDE 2 MG/ML
0.5 INJECTION INTRAMUSCULAR; INTRAVENOUS; SUBCUTANEOUS EVERY 4 HOURS
Refills: 0 | Status: DISCONTINUED | OUTPATIENT
Start: 2019-10-10 | End: 2019-10-11

## 2019-10-10 RX ORDER — CEFTRIAXONE 500 MG/1
1000 INJECTION, POWDER, FOR SOLUTION INTRAMUSCULAR; INTRAVENOUS EVERY 24 HOURS
Refills: 0 | Status: DISCONTINUED | OUTPATIENT
Start: 2019-10-10 | End: 2019-10-11

## 2019-10-10 RX ORDER — CEFTRIAXONE 500 MG/1
2000 INJECTION, POWDER, FOR SOLUTION INTRAMUSCULAR; INTRAVENOUS EVERY 24 HOURS
Refills: 0 | Status: DISCONTINUED | OUTPATIENT
Start: 2019-10-10 | End: 2019-10-11

## 2019-10-10 RX ADMIN — SODIUM CHLORIDE 1000 MILLILITER(S): 9 INJECTION INTRAMUSCULAR; INTRAVENOUS; SUBCUTANEOUS at 21:57

## 2019-10-10 NOTE — ED PROVIDER NOTE - CLINICAL SUMMARY MEDICAL DECISION MAKING FREE TEXT BOX
23 y/o F sent by radiology for newly diagnosed acute appendicitis. Patient with 2 days of lower abdominal pain now focal to the RLQ with nausea and no vomiting. Patient stable on exam with tenderness, reviewed radiologist's report confirming appendicitis, plan consult with surgical team. Will get pre-op labs, keep NPO, start fluids, abx, and admit.

## 2019-10-10 NOTE — H&P ADULT - ASSESSMENT
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    - admit to Dr. Blood, regional  - pain/nausea control  - NPO/IVF  - ceftriaxone/flagyl  - SCDs  - OR tonight for lap appy  - plan discussed with chief resident and attending

## 2019-10-10 NOTE — ED ADULT NURSE NOTE - OTHER COMPLAINTS
CC of bilat lower quadrant abd pain x yesterday + nausea and vomiting. had a CT from [facility] radiology and confirming appendicitis and prompted to come over

## 2019-10-10 NOTE — ED ADULT NURSE NOTE - OBJECTIVE STATEMENT
Pt. referred to ED by surgeon MD Gonsalves, pt. had outpatient CT at  radiology confirming appendicitis. Pt. c/o lower abd pain x 3 days and nausea that began today. Denies V&D. Denies fever, chills, body aches. Pain has migrated to RLQ, worsens w/ any movement. Pt. had same Sx 2 months ago and was told she had chronic appendicitis. Pt. on daily aspirin, states she has "genetic problem w/ increased risk of blood clots."

## 2019-10-10 NOTE — ED PROVIDER NOTE - OBJECTIVE STATEMENT
25 y/o F with no PMHx presents to the ED after being sent by radiologist for newly diagnosed acute appendicitis. Patient with 2 days of lower abdominal pain, later focal to the RLQ, worse with movement, distention, and now nausea today. Went to [facility] 2 months ago for similar symptoms, has since followed up with Dr. Gonsalves for symptoms. Denies fever, chills, vomiting, diarrhea, urinary symptoms, or any other acute complaints. Patient on birth control and baby aspirin, no chance of pregnancy.

## 2019-10-10 NOTE — ED ADULT TRIAGE NOTE - OTHER COMPLAINTS
CC of bilat lower quadrant abd pain x yesterday + nausea and vomiting. had a CT from [facility] radiology and confirming appendicitis and prompted to come over

## 2019-10-10 NOTE — H&P ADULT - HISTORY OF PRESENT ILLNESS
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 Patient began experiencing diffuse abdominal pain on Tuesday which has since migrated to [facility], associated with nausea. She had a similar episode 2 months ago is believed to have chronic appendicitis. Denies emesis, constipation, diarrhea, hematochezia, melena, fevers, chills. Fam hx of bladder, endometrial, and colon cancer.

## 2019-10-10 NOTE — H&P ADULT - NSHPLABSRESULTS_GEN_ALL_CORE
13.4   5.59  )-----------( 292      ( 10 Oct 2019 21:57 )             40.8   10-10    137  |  103  |  13  ----------------------------<  77  4.4   |  23  |  0.77    Ca    9.7      10 Oct 2019 21:57    Outside CT significant for acute uncomplicated appendicitis.

## 2019-10-10 NOTE — H&P ADULT - NSHPPHYSICALEXAM_GEN_ALL_CORE
Vital Signs Last 24 Hrs  T(C): 36.8 (10 Oct 2019 20:42), Max: 36.8 (10 Oct 2019 20:42)  T(F): 98.3 (10 Oct 2019 20:42), Max: 98.3 (10 Oct 2019 20:42)  HR: 82 (10 Oct 2019 20:42) (82 - 82)  BP: 129/84 (10 Oct 2019 20:42) (129/84 - 129/84)  BP(mean): --  RR: 15 (10 Oct 2019 20:42) (15 - 15)  SpO2: 100% (10 Oct 2019 20:42) (100% - 100%)    General: NAD  Pulm: normal resp effort and excursion  Abd: soft, ND, mild RLQ TTP

## 2019-10-11 ENCOUNTER — TRANSCRIPTION ENCOUNTER (OUTPATIENT)
Age: 24
End: 2019-10-11

## 2019-10-11 VITALS
OXYGEN SATURATION: 98 % | SYSTOLIC BLOOD PRESSURE: 110 MMHG | HEART RATE: 80 BPM | DIASTOLIC BLOOD PRESSURE: 70 MMHG | TEMPERATURE: 98 F | RESPIRATION RATE: 16 BRPM

## 2019-10-11 DIAGNOSIS — E72.12 METHYLENETETRAHYDROFOLATE REDUCTASE DEFICIENCY: ICD-10-CM

## 2019-10-11 LAB
ANION GAP SERPL CALC-SCNC: 13 MMOL/L — SIGNIFICANT CHANGE UP (ref 5–17)
ANION GAP SERPL CALC-SCNC: 14 MMOL/L — SIGNIFICANT CHANGE UP (ref 5–17)
BUN SERPL-MCNC: 11 MG/DL — SIGNIFICANT CHANGE UP (ref 7–23)
BUN SERPL-MCNC: 9 MG/DL — SIGNIFICANT CHANGE UP (ref 7–23)
CALCIUM SERPL-MCNC: 9 MG/DL — SIGNIFICANT CHANGE UP (ref 8.4–10.5)
CALCIUM SERPL-MCNC: 9.3 MG/DL — SIGNIFICANT CHANGE UP (ref 8.4–10.5)
CHLORIDE SERPL-SCNC: 104 MMOL/L — SIGNIFICANT CHANGE UP (ref 96–108)
CHLORIDE SERPL-SCNC: 104 MMOL/L — SIGNIFICANT CHANGE UP (ref 96–108)
CO2 SERPL-SCNC: 20 MMOL/L — LOW (ref 22–31)
CO2 SERPL-SCNC: 20 MMOL/L — LOW (ref 22–31)
CREAT SERPL-MCNC: 0.71 MG/DL — SIGNIFICANT CHANGE UP (ref 0.5–1.3)
CREAT SERPL-MCNC: 0.75 MG/DL — SIGNIFICANT CHANGE UP (ref 0.5–1.3)
GLUCOSE SERPL-MCNC: 105 MG/DL — HIGH (ref 70–99)
GLUCOSE SERPL-MCNC: 113 MG/DL — HIGH (ref 70–99)
GRAM STN FLD: SIGNIFICANT CHANGE UP
GRAM STN FLD: SIGNIFICANT CHANGE UP
HCT VFR BLD CALC: 38.4 % — SIGNIFICANT CHANGE UP (ref 34.5–45)
HCT VFR BLD CALC: 40.2 % — SIGNIFICANT CHANGE UP (ref 34.5–45)
HGB BLD-MCNC: 12.3 G/DL — SIGNIFICANT CHANGE UP (ref 11.5–15.5)
HGB BLD-MCNC: 13 G/DL — SIGNIFICANT CHANGE UP (ref 11.5–15.5)
MAGNESIUM SERPL-MCNC: 2 MG/DL — SIGNIFICANT CHANGE UP (ref 1.6–2.6)
MAGNESIUM SERPL-MCNC: 2.1 MG/DL — SIGNIFICANT CHANGE UP (ref 1.6–2.6)
MCHC RBC-ENTMCNC: 29.7 PG — SIGNIFICANT CHANGE UP (ref 27–34)
MCHC RBC-ENTMCNC: 30 PG — SIGNIFICANT CHANGE UP (ref 27–34)
MCHC RBC-ENTMCNC: 32 GM/DL — SIGNIFICANT CHANGE UP (ref 32–36)
MCHC RBC-ENTMCNC: 32.3 GM/DL — SIGNIFICANT CHANGE UP (ref 32–36)
MCV RBC AUTO: 92.8 FL — SIGNIFICANT CHANGE UP (ref 80–100)
MCV RBC AUTO: 92.8 FL — SIGNIFICANT CHANGE UP (ref 80–100)
NRBC # BLD: 0 /100 WBCS — SIGNIFICANT CHANGE UP (ref 0–0)
NRBC # BLD: 0 /100 WBCS — SIGNIFICANT CHANGE UP (ref 0–0)
PHOSPHATE SERPL-MCNC: 3.2 MG/DL — SIGNIFICANT CHANGE UP (ref 2.5–4.5)
PHOSPHATE SERPL-MCNC: 4.6 MG/DL — HIGH (ref 2.5–4.5)
PLATELET # BLD AUTO: 260 K/UL — SIGNIFICANT CHANGE UP (ref 150–400)
PLATELET # BLD AUTO: 278 K/UL — SIGNIFICANT CHANGE UP (ref 150–400)
POTASSIUM SERPL-MCNC: 4.2 MMOL/L — SIGNIFICANT CHANGE UP (ref 3.5–5.3)
POTASSIUM SERPL-MCNC: 4.7 MMOL/L — SIGNIFICANT CHANGE UP (ref 3.5–5.3)
POTASSIUM SERPL-SCNC: 4.2 MMOL/L — SIGNIFICANT CHANGE UP (ref 3.5–5.3)
POTASSIUM SERPL-SCNC: 4.7 MMOL/L — SIGNIFICANT CHANGE UP (ref 3.5–5.3)
RBC # BLD: 4.14 M/UL — SIGNIFICANT CHANGE UP (ref 3.8–5.2)
RBC # BLD: 4.33 M/UL — SIGNIFICANT CHANGE UP (ref 3.8–5.2)
RBC # FLD: 12.2 % — SIGNIFICANT CHANGE UP (ref 10.3–14.5)
RBC # FLD: 12.4 % — SIGNIFICANT CHANGE UP (ref 10.3–14.5)
SODIUM SERPL-SCNC: 137 MMOL/L — SIGNIFICANT CHANGE UP (ref 135–145)
SODIUM SERPL-SCNC: 138 MMOL/L — SIGNIFICANT CHANGE UP (ref 135–145)
WBC # BLD: 7.02 K/UL — SIGNIFICANT CHANGE UP (ref 3.8–10.5)
WBC # BLD: 8.39 K/UL — SIGNIFICANT CHANGE UP (ref 3.8–10.5)
WBC # FLD AUTO: 7.02 K/UL — SIGNIFICANT CHANGE UP (ref 3.8–10.5)
WBC # FLD AUTO: 8.39 K/UL — SIGNIFICANT CHANGE UP (ref 3.8–10.5)

## 2019-10-11 RX ORDER — BUPIVACAINE 13.3 MG/ML
20 INJECTION, SUSPENSION, LIPOSOMAL INFILTRATION ONCE
Refills: 0 | Status: DISCONTINUED | OUTPATIENT
Start: 2019-10-11 | End: 2019-10-11

## 2019-10-11 RX ORDER — ENOXAPARIN SODIUM 100 MG/ML
40 INJECTION SUBCUTANEOUS
Qty: 280 | Refills: 0
Start: 2019-10-11 | End: 2019-10-17

## 2019-10-11 RX ORDER — HEPARIN SODIUM 5000 [USP'U]/ML
5000 INJECTION INTRAVENOUS; SUBCUTANEOUS EVERY 8 HOURS
Refills: 0 | Status: DISCONTINUED | OUTPATIENT
Start: 2019-10-11 | End: 2019-10-11

## 2019-10-11 RX ORDER — KETOROLAC TROMETHAMINE 30 MG/ML
30 SYRINGE (ML) INJECTION EVERY 6 HOURS
Refills: 0 | Status: DISCONTINUED | OUTPATIENT
Start: 2019-10-11 | End: 2019-10-11

## 2019-10-11 RX ORDER — ACETAMINOPHEN 500 MG
650 TABLET ORAL EVERY 6 HOURS
Refills: 0 | Status: DISCONTINUED | OUTPATIENT
Start: 2019-10-11 | End: 2019-10-11

## 2019-10-11 RX ADMIN — Medication 30 MILLIGRAM(S): at 07:55

## 2019-10-11 RX ADMIN — Medication 100 MILLIGRAM(S): at 14:48

## 2019-10-11 RX ADMIN — HEPARIN SODIUM 5000 UNIT(S): 5000 INJECTION INTRAVENOUS; SUBCUTANEOUS at 14:41

## 2019-10-11 RX ADMIN — Medication 30 MILLIGRAM(S): at 12:02

## 2019-10-11 RX ADMIN — Medication 30 MILLIGRAM(S): at 06:12

## 2019-10-11 RX ADMIN — Medication 100 MILLIGRAM(S): at 06:11

## 2019-10-11 RX ADMIN — SODIUM CHLORIDE 110 MILLILITER(S): 9 INJECTION, SOLUTION INTRAVENOUS at 06:12

## 2019-10-11 RX ADMIN — HEPARIN SODIUM 5000 UNIT(S): 5000 INJECTION INTRAVENOUS; SUBCUTANEOUS at 06:12

## 2019-10-11 NOTE — PROGRESS NOTE ADULT - SUBJECTIVE AND OBJECTIVE BOX
24 hr events:    SUBJECTIVE:  Pt seen and examined by chief resident. Pt is doing well, resting comfortably on bed. Pain controlled. +F/-BM. No nausea or vomiting. No complaints at this time.    Vital Signs Last 24 Hrs  T(C): 36.6 (11 Oct 2019 09:20), Max: 36.8 (10 Oct 2019 20:42)  T(F): 97.8 (11 Oct 2019 09:20), Max: 98.3 (10 Oct 2019 20:42)  HR: 84 (11 Oct 2019 09:20) (78 - 100)  BP: 104/69 (11 Oct 2019 09:20) (99/63 - 129/84)  BP(mean): 74 (11 Oct 2019 03:05) (74 - 90)  RR: 17 (11 Oct 2019 09:20) (12 - 21)  SpO2: 98% (11 Oct 2019 09:20) (97% - 100%)    Physical Exam:  General: NAD  Pulmonary: Nonlabored breathing, no respiratory distress  Abdominal: soft, NT/ND, no rebound/guarding  Extremities: WWP, SCDs in place  Neuro: A/O x3,       I&O's Summary    10 Oct 2019 07:01  -  11 Oct 2019 07:00  --------------------------------------------------------  IN: 330 mL / OUT: 325 mL / NET: 5 mL        LABS:                        12.3   8.39  )-----------( 260      ( 11 Oct 2019 05:54 )             38.4     10-11    138  |  104  |  11  ----------------------------<  113<H>  4.2   |  20<L>  |  0.75    Ca    9.0      11 Oct 2019 05:54  Phos  3.2     10-11  Mg     2.0     10-11      PT/INR - ( 10 Oct 2019 21:57 )   PT: 12.0 sec;   INR: 1.06          PTT - ( 10 Oct 2019 21:57 )  PTT:26.3 sec

## 2019-10-11 NOTE — DISCHARGE NOTE NURSING/CASE MANAGEMENT/SOCIAL WORK - PATIENT PORTAL LINK FT
You can access the FollowMyHealth Patient Portal offered by [facility] by registering at the following website: [email or web address]. By joining [facility]’s FollowMyHealth portal, you will also be able to view your health information using other applications (apps) compatible with our system.

## 2019-10-11 NOTE — PROGRESS NOTE ADULT - ASSESSMENT
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    - pain/nausea control  - CLD/IVF  - ceftriaxone/flagyl  - SCDs

## 2019-10-11 NOTE — DISCHARGE NOTE PROVIDER - CARE PROVIDER_API CALL
Ashley Blood (MD)  Surgery  [address], [address]  [place], [place] [address]  Phone: [phone]  Fax: [phone]  Follow Up Time:

## 2019-10-11 NOTE — PROGRESS NOTE ADULT - SUBJECTIVE AND OBJECTIVE BOX
INTERVAL HPI/OVERNIGHT EVENTS:   SURGERY ATTENDING    STATUS POST:  Laparoscopic appendectomy    POST OPERATIVE DAY #: 0    SUBJECTIVE:  Flatus: [ ] YES [x ] NO             Bowel Movement: [ ] YES [x ] NO  Pain (0-10):      3      Pain Control Adequate: [x ] YES [ ] NO  Nausea: [ ] YES [x ] NO            Vomiting: [ ] YES [x ] NO  Diarrhea: [ ] YES [x ] NO         Constipation: [ ] YES [x ] NO     Chest Pain: [ ] YES [x ] NO    SOB:  [ ] YES [x ] NO    MEDICATIONS  (STANDING):  BUpivacaine liposome 1.3% Injectable (no eMAR) 20 milliLiter(s) Local Injection once  cefTRIAXone   IVPB 2000 milliGRAM(s) IV Intermittent every 24 hours  heparin  Injectable 5000 Unit(s) SubCutaneous every 8 hours  ketorolac   Injectable 30 milliGRAM(s) IV Push every 6 hours  lactated ringers. 1000 milliLiter(s) (110 mL/Hr) IV Continuous <Continuous>  metroNIDAZOLE  IVPB 500 milliGRAM(s) IV Intermittent every 8 hours    MEDICATIONS  (PRN):  acetaminophen   Tablet .. 650 milliGRAM(s) Oral every 6 hours PRN Moderate Pain (4 - 6)  ondansetron Injectable 4 milliGRAM(s) IV Push every 6 hours PRN Nausea      Vital Signs Last 24 Hrs  T(C): 36.7 (11 Oct 2019 12:29), Max: 36.8 (10 Oct 2019 20:42)  T(F): 98.1 (11 Oct 2019 12:29), Max: 98.3 (10 Oct 2019 20:42)  HR: 81 (11 Oct 2019 12:29) (78 - 100)  BP: 104/72 (11 Oct 2019 12:29) (99/63 - 129/84)  BP(mean): 74 (11 Oct 2019 03:05) (74 - 90)  RR: 16 (11 Oct 2019 12:29) (12 - 21)  SpO2: 97% (11 Oct 2019 12:29) (97% - 100%)    PHYSICAL EXAM:      Constitutional:    Eyes:    ENMT:    Neck:    Breasts:    Back:    Respiratory:    Cardiovascular:    Gastrointestinal:    Genitourinary:    Rectal:    Extremities:    Vascular:    Neurological:    Skin:    Lymph Nodes:    Musculoskeletal:    Psychiatric:        I&O's Detail    10 Oct 2019 07:01  -  11 Oct 2019 07:00  --------------------------------------------------------  IN:    lactated ringers.: 330 mL  Total IN: 330 mL    OUT:    Estimated Blood Loss: 50 mL    Indwelling Catheter - Urethral: 275 mL  Total OUT: 325 mL    Total NET: 5 mL          LABS:                        13.0   7.02  )-----------( 278      ( 11 Oct 2019 12:42 )             40.2     10-11    137  |  104  |  9   ----------------------------<  105<H>  4.7   |  20<L>  |  0.71    Ca    9.3      11 Oct 2019 12:42  Phos  4.6     10-11  Mg     2.1     10-11      PT/INR - ( 10 Oct 2019 21:57 )   PT: 12.0 sec;   INR: 1.06          PTT - ( 10 Oct 2019 21:57 )  PTT:26.3 sec      RADIOLOGY & ADDITIONAL STUDIES:

## 2019-10-11 NOTE — DISCHARGE NOTE PROVIDER - NSDCCPCAREPLAN_GEN_ALL_CORE_FT
PRINCIPAL DISCHARGE DIAGNOSIS  Diagnosis: Other acute appendicitis  Assessment and Plan of Treatment: Please follow up with Dr. Blood next week on Friday  in one week Oct 10/18/19. Call his office to schedule an appointment: [phone]. Bed rest for 4 days. Use ice packs on incisions 10 mins on 10 mins off. Drink at least 2L of water in the first 24 hours. Remain on liquid diet until bowel movement, then you may have regular diet. Call the office if you experience increasing pain, nausea, vomiting, swelling, redness, or drainage from incision site, temperature >101.4F  1. Take 2 extra strength Tylenol + 1 Advil = 3 tablets at the same time EVERY 6 hours standing  2. Take 1 Vicodin as needed at bed time for breakthrough pain  3. You have been prescribed oral antibiotics. Please be sure to complete the entire course as directed.  4. You have been prescribed Lovenox subcutaneous, 40mg every 24 hours for 7 days. Please follow instructions and complete all 7 days in order to prevent blood clots.  General Discharge Instructions:  Please resume all regular home medications unless specifically advised not to take a particular medication. Also, please take any new medications as prescribed.  Please get plenty of rest, continue to ambulate several times per day, and drink adequate amounts of fluids. Avoid lifting weights greater than 5-10 lbs until you follow-up with your surgeon, who will instruct you further regarding activity restrictions.  Avoid driving or operating heavy machinery while taking pain medications.  Please follow-up with your surgeon and Primary Care Provider (PCP) as advised  Incision Care:  *Please call your doctor or nurse practitioner if you have increased pain, swelling, redness, or drainage from the incision site.  *Avoid swimming and baths until your follow-up appointment.  *You may shower, and wash surgical incisions with a mild soap and warm water. Gently pat the area dry.

## 2019-10-11 NOTE — PROGRESS NOTE ADULT - SUBJECTIVE AND OBJECTIVE BOX
POST-OPERATIVE NOTE    Procedure: laparoscopic appendectomy    Diagnosis/Indication: acute appendicitis    Surgeon: Caitie    S: Pt has no complaints. Denies Headache, CP, SOB, abdominal pain, calf pain, nausea, vomiting. Pain controlled with medication.     O:  T(C): 36.3 (10-11-19 @ 03:05), Max: 36.4 (10-11-19 @ 01:20)  T(F): 97.4 (10-11-19 @ 03:05), Max: 97.6 (10-11-19 @ 01:20)  HR: 84 (10-11-19 @ 03:05) (78 - 100)  BP: 101/63 (10-11-19 @ 03:05) (99/63 - 114/69)  RR: 15 (10-11-19 @ 03:05) (12 - 21)  SpO2: 98% (10-11-19 @ 03:05) (97% - 100%)  Wt(kg): --                        13.4   5.59  )-----------( 292      ( 10 Oct 2019 21:57 )             40.8     10-10    137  |  103  |  13  ----------------------------<  77  4.4   |  23  |  0.77    Ca    9.7      10 Oct 2019 21:57        Gen: NAD, resting comfortably in bed  C/V: NSR  Pulm: Nonlabored breathing, no respiratory distress  Abd: soft, ATTP, mildly distended, no rebound or guarding  Extrem: WWP, no calf edema or tenderness, SCDs in place      A/P: 24y Female s/p above procedure  Diet: NPO  IVF:  cc/hr  Pain/nausea control  DVT ppx: heparin/scds  Dispo plan: pending

## 2019-10-11 NOTE — DISCHARGE NOTE PROVIDER - HOSPITAL COURSE
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 Patient began experiencing diffuse abdominal pain on Tuesday which has since migrated to [facility], associated with nausea. She had a similar episode 2 months ago is believed to have chronic appendicitis. Denies emesis, constipation, diarrhea, hematochezia, melena, fevers, chills. Fam hx of bladder, endometrial, and colon cancer.        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  On 10/10 pt underwent laparoscopic appendectomy, non perforated non gangrenous. Her postoperative course was unremarkable with advancement of diet, passing trial of void, and pain control. On day of discharge patient was stable to be d/c'd home.

## 2019-10-11 NOTE — DISCHARGE NOTE PROVIDER - NSDCFUADDINST_GEN_ALL_CORE_FT
You have been prescribed Lovenox subcutaneous, 40mg every 24 hours for 7 days. Please follow instructions and complete all 7 days in order to prevent blood clots.    Warning Signs:  Please call your doctor if you experience the following:  *You experience new chest pain, pressure, squeezing or tightness.  *New or worsening cough, shortness of breath, or wheeze.  *If you are vomiting and cannot keep down fluids or your medications.  *You are getting dehydrated due to continued vomiting, diarrhea, or other reasons. Signs of dehydration include dry mouth, rapid heartbeat, or feeling dizzy or faint when standing.  *You see blood or dark/black material when you vomit or have a bowel movement.  *You experience burning when you urinate, have blood in your urine, or experience a discharge.  *Your pain is not improving within 8-12 hours or is not gone within 24 hours. Call or return immediately if your pain is getting worse, changes location, or moves to your chest or back.  *You have shaking chills, or fever greater than 101.5 degrees Fahrenheit or 38 degrees Celsius.  *Any change in your symptoms, or any new symptoms that concern you.

## 2019-10-13 ENCOUNTER — EMERGENCY (EMERGENCY)
Facility: HOSPITAL | Age: 24
LOS: 1 days | Discharge: HOME | End: 2019-10-13
Attending: EMERGENCY MEDICINE | Admitting: EMERGENCY MEDICINE
Payer: COMMERCIAL

## 2019-10-13 VITALS
SYSTOLIC BLOOD PRESSURE: 126 MMHG | HEART RATE: 87 BPM | DIASTOLIC BLOOD PRESSURE: 87 MMHG | TEMPERATURE: 98 F | RESPIRATION RATE: 18 BRPM | HEIGHT: 70 IN | WEIGHT: 158.07 LBS | OXYGEN SATURATION: 100 %

## 2019-10-13 VITALS
DIASTOLIC BLOOD PRESSURE: 68 MMHG | TEMPERATURE: 98 F | RESPIRATION RATE: 16 BRPM | SYSTOLIC BLOOD PRESSURE: 111 MMHG | HEART RATE: 75 BPM | OXYGEN SATURATION: 98 %

## 2019-10-13 LAB
ALBUMIN SERPL ELPH-MCNC: 4 G/DL — SIGNIFICANT CHANGE UP (ref 3.3–5)
ALP SERPL-CCNC: 32 U/L — LOW (ref 40–120)
ALT FLD-CCNC: 11 U/L — SIGNIFICANT CHANGE UP (ref 10–45)
ANION GAP SERPL CALC-SCNC: 10 MMOL/L — SIGNIFICANT CHANGE UP (ref 5–17)
APPEARANCE UR: CLEAR — SIGNIFICANT CHANGE UP
AST SERPL-CCNC: 16 U/L — SIGNIFICANT CHANGE UP (ref 10–40)
BASOPHILS # BLD AUTO: 0.04 K/UL — SIGNIFICANT CHANGE UP (ref 0–0.2)
BASOPHILS NFR BLD AUTO: 0.8 % — SIGNIFICANT CHANGE UP (ref 0–2)
BILIRUB SERPL-MCNC: 0.3 MG/DL — SIGNIFICANT CHANGE UP (ref 0.2–1.2)
BILIRUB UR-MCNC: NEGATIVE — SIGNIFICANT CHANGE UP
BUN SERPL-MCNC: 7 MG/DL — SIGNIFICANT CHANGE UP (ref 7–23)
CALCIUM SERPL-MCNC: 9.6 MG/DL — SIGNIFICANT CHANGE UP (ref 8.4–10.5)
CHLORIDE SERPL-SCNC: 106 MMOL/L — SIGNIFICANT CHANGE UP (ref 96–108)
CO2 SERPL-SCNC: 24 MMOL/L — SIGNIFICANT CHANGE UP (ref 22–31)
COLOR SPEC: YELLOW — SIGNIFICANT CHANGE UP
CREAT SERPL-MCNC: 0.78 MG/DL — SIGNIFICANT CHANGE UP (ref 0.5–1.3)
DIFF PNL FLD: NEGATIVE — SIGNIFICANT CHANGE UP
EOSINOPHIL # BLD AUTO: 0.09 K/UL — SIGNIFICANT CHANGE UP (ref 0–0.5)
EOSINOPHIL NFR BLD AUTO: 1.8 % — SIGNIFICANT CHANGE UP (ref 0–6)
GLUCOSE SERPL-MCNC: 83 MG/DL — SIGNIFICANT CHANGE UP (ref 70–99)
GLUCOSE UR QL: NEGATIVE — SIGNIFICANT CHANGE UP
HCT VFR BLD CALC: 40.2 % — SIGNIFICANT CHANGE UP (ref 34.5–45)
HGB BLD-MCNC: 13.1 G/DL — SIGNIFICANT CHANGE UP (ref 11.5–15.5)
IMM GRANULOCYTES NFR BLD AUTO: 0.2 % — SIGNIFICANT CHANGE UP (ref 0–1.5)
KETONES UR-MCNC: 15 MG/DL
LEUKOCYTE ESTERASE UR-ACNC: ABNORMAL
LYMPHOCYTES # BLD AUTO: 1.57 K/UL — SIGNIFICANT CHANGE UP (ref 1–3.3)
LYMPHOCYTES # BLD AUTO: 31.8 % — SIGNIFICANT CHANGE UP (ref 13–44)
MCHC RBC-ENTMCNC: 30 PG — SIGNIFICANT CHANGE UP (ref 27–34)
MCHC RBC-ENTMCNC: 32.6 GM/DL — SIGNIFICANT CHANGE UP (ref 32–36)
MCV RBC AUTO: 92.2 FL — SIGNIFICANT CHANGE UP (ref 80–100)
MONOCYTES # BLD AUTO: 0.31 K/UL — SIGNIFICANT CHANGE UP (ref 0–0.9)
MONOCYTES NFR BLD AUTO: 6.3 % — SIGNIFICANT CHANGE UP (ref 2–14)
NEUTROPHILS # BLD AUTO: 2.91 K/UL — SIGNIFICANT CHANGE UP (ref 1.8–7.4)
NEUTROPHILS NFR BLD AUTO: 59.1 % — SIGNIFICANT CHANGE UP (ref 43–77)
NITRITE UR-MCNC: NEGATIVE — SIGNIFICANT CHANGE UP
NRBC # BLD: 0 /100 WBCS — SIGNIFICANT CHANGE UP (ref 0–0)
PH UR: 6 — SIGNIFICANT CHANGE UP (ref 5–8)
PLATELET # BLD AUTO: 271 K/UL — SIGNIFICANT CHANGE UP (ref 150–400)
POTASSIUM SERPL-MCNC: 4.4 MMOL/L — SIGNIFICANT CHANGE UP (ref 3.5–5.3)
POTASSIUM SERPL-SCNC: 4.4 MMOL/L — SIGNIFICANT CHANGE UP (ref 3.5–5.3)
PROT SERPL-MCNC: 6.9 G/DL — SIGNIFICANT CHANGE UP (ref 6–8.3)
PROT UR-MCNC: NEGATIVE MG/DL — SIGNIFICANT CHANGE UP
RBC # BLD: 4.36 M/UL — SIGNIFICANT CHANGE UP (ref 3.8–5.2)
RBC # FLD: 12.9 % — SIGNIFICANT CHANGE UP (ref 10.3–14.5)
SODIUM SERPL-SCNC: 140 MMOL/L — SIGNIFICANT CHANGE UP (ref 135–145)
SP GR SPEC: 1.02 — SIGNIFICANT CHANGE UP (ref 1–1.03)
UROBILINOGEN FLD QL: 0.2 E.U./DL — SIGNIFICANT CHANGE UP
WBC # BLD: 4.93 K/UL — SIGNIFICANT CHANGE UP (ref 3.8–10.5)
WBC # FLD AUTO: 4.93 K/UL — SIGNIFICANT CHANGE UP (ref 3.8–10.5)

## 2019-10-13 PROCEDURE — 87449 NOS EACH ORGANISM AG IA: CPT

## 2019-10-13 PROCEDURE — 99284 EMERGENCY DEPT VISIT MOD MDM: CPT

## 2019-10-13 PROCEDURE — 87493 C DIFF AMPLIFIED PROBE: CPT

## 2019-10-13 PROCEDURE — 96374 THER/PROPH/DIAG INJ IV PUSH: CPT

## 2019-10-13 PROCEDURE — 87086 URINE CULTURE/COLONY COUNT: CPT

## 2019-10-13 PROCEDURE — 85025 COMPLETE CBC W/AUTO DIFF WBC: CPT

## 2019-10-13 PROCEDURE — 36415 COLL VENOUS BLD VENIPUNCTURE: CPT

## 2019-10-13 PROCEDURE — 80053 COMPREHEN METABOLIC PANEL: CPT

## 2019-10-13 PROCEDURE — 81001 URINALYSIS AUTO W/SCOPE: CPT

## 2019-10-13 PROCEDURE — 99284 EMERGENCY DEPT VISIT MOD MDM: CPT | Mod: 25

## 2019-10-13 PROCEDURE — 87324 CLOSTRIDIUM AG IA: CPT

## 2019-10-13 RX ORDER — ONDANSETRON 8 MG/1
1 TABLET, FILM COATED ORAL
Qty: 15 | Refills: 0
Start: 2019-10-13

## 2019-10-13 RX ORDER — SODIUM CHLORIDE 9 MG/ML
2000 INJECTION INTRAMUSCULAR; INTRAVENOUS; SUBCUTANEOUS ONCE
Refills: 0 | Status: COMPLETED | OUTPATIENT
Start: 2019-10-13 | End: 2019-10-13

## 2019-10-13 RX ORDER — NITROFURANTOIN MACROCRYSTAL 50 MG
1 CAPSULE ORAL
Qty: 6 | Refills: 0
Start: 2019-10-13 | End: 2019-10-15

## 2019-10-13 RX ORDER — ONDANSETRON 8 MG/1
4 TABLET, FILM COATED ORAL ONCE
Refills: 0 | Status: COMPLETED | OUTPATIENT
Start: 2019-10-13 | End: 2019-10-13

## 2019-10-13 RX ADMIN — ONDANSETRON 4 MILLIGRAM(S): 8 TABLET, FILM COATED ORAL at 16:10

## 2019-10-13 RX ADMIN — SODIUM CHLORIDE 2000 MILLILITER(S): 9 INJECTION INTRAMUSCULAR; INTRAVENOUS; SUBCUTANEOUS at 15:27

## 2019-10-13 NOTE — ED PROVIDER NOTE - CARE PROVIDER_API CALL
Ashley Blood (MD)  Surgery  [address], [address]  [place], [place] [address]  Phone: [phone]  Fax: [phone]  Follow Up Time:

## 2019-10-13 NOTE — ED ADULT NURSE NOTE - INTERVENTIONS DEFINITIONS
Physically safe environment: no spills, clutter or unnecessary equipment/Stretcher in lowest position, wheels locked, appropriate side rails in place/Instruct patient to call for assistance/Monitor gait and stability

## 2019-10-13 NOTE — ED PROVIDER NOTE - CLINICAL SUMMARY MEDICAL DECISION MAKING FREE TEXT BOX
Patient in ED w concern for diarrhea today whenever she tries to eat.  Patient otherwise without complaints. Patient in ED w concern for diarrhea today whenever she tries to eat.  Patient otherwise without complaints.  Basic labs and c diff sent and all results are reviewed.  Gen sx in ED to evaluate and ok with discharge, however recommending zofran and macrobid x 3 days.  Plan is discussed with patient and mother at bedside who are agreeable to outpatient post op follow up with Dr. Aguilar in 2-3 days as previously planned.  Patient is instructed to return to ED immediately should her symptoms worsen or if she has any concern prior to this recommended follow up.  Patient and mother aware of plan and verbalize their understanding.  Will discharge home at this time.

## 2019-10-13 NOTE — ED PROVIDER NOTE - GASTROINTESTINAL, MLM
Abdomen soft, mild tenderness to lower abdomen, steristrips in place to lower abdomen without significant overlying erythema, no drainage.

## 2019-10-13 NOTE — CONSULT NOTE ADULT - SUBJECTIVE AND OBJECTIVE BOX
GENERAL SURGERY CONSULT NOTE    HPI:    Ms. 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    Patient reports that upon starting her augmentin this morning, she developed 10-15 episodes of diarrhea. She denies any hematochezia or melena. She denies fevers or chills. She reports having some nausea, but denies emesis.    In the ED, patient's vitals are stable and within normal limits. WBC is wnl and remaining labs are also wnl.     PAST MEDICAL & SURGICAL HISTORY:  MTHFR gene mutation  No significant past surgical history    PAST SURGICAL HISTORY:   As above    REVIEW OF SYSTEMS:   Pertinent positives/negatives noted in HPI.     HOME MEDICATIONS:    ALLERGIES:  Allergies    azithromycin (Hives; Urticaria)  clindamycin (Rash; Urticaria; Hives)  shellfish (Anaphylaxis; Rash; Urticaria; Hives)    Intolerances        SOCIAL HISTORY:    FAMILY HISTORY:      Vital Signs Last 24 Hrs  T(C): 36.7 (13 Oct 2019 12:04), Max: 36.7 (13 Oct 2019 12:04)  T(F): 98.1 (13 Oct 2019 12:04), Max: 98.1 (13 Oct 2019 12:04)  HR: 87 (13 Oct 2019 12:04) (87 - 87)  BP: 126/87 (13 Oct 2019 12:04) (126/87 - 126/87)  BP(mean): --  RR: 18 (13 Oct 2019 12:04) (18 - 18)  SpO2: 100% (13 Oct 2019 12:04) (100% - 100%)    PHYSICAL EXAM:  General: no acute distress  Cardiovascular: NSR  Pulmonary: nonlabored breathing, no respiratory distress  Abdomen: soft, nondistended, nontender  Extremities: nonedematous    LABS:                        13.1   4.93  )-----------( 271      ( 13 Oct 2019 13:32 )             40.2     10-    140  |  106  |  7   ----------------------------<  83  4.4   |  24  |  0.78    Ca    9.6      13 Oct 2019 13:33    TPro  6.9  /  Alb  4.0  /  TBili  0.3  /  DBili  x   /  AST  16  /  ALT  11  /  AlkPhos  32<L>  10-13      Urinalysis Basic - ( 13 Oct 2019 14:14 )    Color: Yellow / Appearance: Clear / S.020 / pH: x  Gluc: x / Ketone: 15 mg/dL  / Bili: Negative / Urobili: 0.2 E.U./dL   Blood: x / Protein: NEGATIVE mg/dL / Nitrite: NEGATIVE   Leuk Esterase: Trace / RBC: < 5 /HPF / WBC 5-10 /HPF   Sq Epi: x / Non Sq Epi: 5-10 /HPF / Bacteria: Present /HPF GENERAL SURGERY CONSULT NOTE    HPI:    Ms. 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    Patient reports that upon starting her augmentin this morning, she developed 10-15 episodes of diarrhea. She denies any hematochezia or melena. She denies fevers or chills. She reports having some nausea, but denies emesis.    In the ED, patient's vitals are stable and within normal limits. WBC is wnl and remaining labs are also wnl.     PAST MEDICAL & SURGICAL HISTORY:  MTHFR gene mutation  No significant past surgical history    PAST SURGICAL HISTORY:   As above    REVIEW OF SYSTEMS:   Pertinent positives/negatives noted in HPI.     HOME MEDICATIONS:    ALLERGIES:  Allergies    azithromycin (Hives; Urticaria)  clindamycin (Rash; Urticaria; Hives)  shellfish (Anaphylaxis; Rash; Urticaria; Hives)    Intolerances    SOCIAL HISTORY:  Denies tobacco, ETOH, or drug use.    FAMILY HISTORY:  No FH of IBD.    Vital Signs Last 24 Hrs  T(C): 36.7 (13 Oct 2019 12:04), Max: 36.7 (13 Oct 2019 12:04)  T(F): 98.1 (13 Oct 2019 12:04), Max: 98.1 (13 Oct 2019 12:04)  HR: 87 (13 Oct 2019 12:04) (87 - 87)  BP: 126/87 (13 Oct 2019 12:04) (126/87 - 126/87)  BP(mean): --  RR: 18 (13 Oct 2019 12:04) (18 - 18)  SpO2: 100% (13 Oct 2019 12:04) (100% - 100%)    PHYSICAL EXAM:  General: no acute distress  Cardiovascular: NSR  Pulmonary: nonlabored breathing, no respiratory distress  Abdomen: mildly distended abdomen, but soft, mild  Extremities: nonedematous    LABS:                        13.1   4.93  )-----------( 271      ( 13 Oct 2019 13:32 )             40.2     10-    140  |  106  |  7   ----------------------------<  83  4.4   |  24  |  0.78    Ca    9.6      13 Oct 2019 13:33    TPro  6.9  /  Alb  4.0  /  TBili  0.3  /  DBili  x   /  AST  16  /  ALT  11  /  AlkPhos  32<L>  10-13      Urinalysis Basic - ( 13 Oct 2019 14:14 )    Color: Yellow / Appearance: Clear / S.020 / pH: x  Gluc: x / Ketone: 15 mg/dL  / Bili: Negative / Urobili: 0.2 E.U./dL   Blood: x / Protein: NEGATIVE mg/dL / Nitrite: NEGATIVE   Leuk Esterase: Trace / RBC: < 5 /HPF / WBC 5-10 /HPF   Sq Epi: x / Non Sq Epi: 5-10 /HPF / Bacteria: Present /HPF GENERAL SURGERY CONSULT NOTE    HPI:    Ms. 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    Patient reports that upon starting her augmentin this morning, she developed 10-15 episodes of diarrhea. She denies any hematochezia or melena. She denies fevers or chills. She reports having some nausea, but denies emesis.    In the ED, patient's vitals are stable and within normal limits. WBC is wnl and remaining labs are also wnl.     PAST MEDICAL & SURGICAL HISTORY:  MTHFR gene mutation  No significant past surgical history    PAST SURGICAL HISTORY:   As above    REVIEW OF SYSTEMS:   Pertinent positives/negatives noted in HPI.     HOME MEDICATIONS:    ALLERGIES:  Allergies    azithromycin (Hives; Urticaria)  clindamycin (Rash; Urticaria; Hives)  shellfish (Anaphylaxis; Rash; Urticaria; Hives)    Intolerances    SOCIAL HISTORY:  Denies tobacco, ETOH, or drug use.    FAMILY HISTORY:  No FH of IBD.    Vital Signs Last 24 Hrs  T(C): 36.7 (13 Oct 2019 12:04), Max: 36.7 (13 Oct 2019 12:04)  T(F): 98.1 (13 Oct 2019 12:04), Max: 98.1 (13 Oct 2019 12:04)  HR: 87 (13 Oct 2019 12:04) (87 - 87)  BP: 126/87 (13 Oct 2019 12:04) (126/87 - 126/87)  BP(mean): --  RR: 18 (13 Oct 2019 12:04) (18 - 18)  SpO2: 100% (13 Oct 2019 12:04) (100% - 100%)    PHYSICAL EXAM:  General: no acute distress  Cardiovascular: NSR  Pulmonary: nonlabored breathing, no respiratory distress  Abdomen: mildly distended abdomen, soft, appropriate incision tenderness. Incisions are clean, dry, intact.  Extremities: nonedematous    LABS:                        13.1   4.93  )-----------( 271      ( 13 Oct 2019 13:32 )             40.2     10-13    140  |  106  |  7   ----------------------------<  83  4.4   |  24  |  0.78    Ca    9.6      13 Oct 2019 13:33    TPro  6.9  /  Alb  4.0  /  TBili  0.3  /  DBili  x   /  AST  16  /  ALT  11  /  AlkPhos  32<L>  10-13      Urinalysis Basic - ( 13 Oct 2019 14:14 )    Color: Yellow / Appearance: Clear / S.020 / pH: x  Gluc: x / Ketone: 15 mg/dL  / Bili: Negative / Urobili: 0.2 E.U./dL   Blood: x / Protein: NEGATIVE mg/dL / Nitrite: NEGATIVE   Leuk Esterase: Trace / RBC: < 5 /HPF / WBC 5-10 /HPF   Sq Epi: x / Non Sq Epi: 5-10 /HPF / Bacteria: Present /HPF

## 2019-10-13 NOTE — ED PROVIDER NOTE - GASTROINTESTINAL NEGATIVE STATEMENT, MLM
no change in post operative abdominal pain, no bloating, no constipation, + diarrhea, no nausea and no vomiting.

## 2019-10-13 NOTE — ED ADULT NURSE REASSESSMENT NOTE - NS ED NURSE REASSESS COMMENT FT1
Pt. denies nausea after IV Zofran, given water, mother has food at bedside, for PO challenge. Waiting for surgery consult.

## 2019-10-13 NOTE — ED PROVIDER NOTE - PATIENT PORTAL LINK FT
You can access the FollowMyHealth Patient Portal offered by [facility] by registering at the following website: [email or web address]. By joining [employer]’s FollowMyHealth portal, you will also be able to view your health information using other applications (apps) compatible with our system.

## 2019-10-13 NOTE — ED PROVIDER NOTE - NSFOLLOWUPINSTRUCTIONS_ED_ALL_ED_FT
Please follow up with your primary physician in 1-2 days for re evaluation.  Please return to ER immediately should your symptoms worsen or if you have any concern prior to this recommended follow up.    Acute Diarrhea    WHAT YOU NEED TO KNOW:    Acute diarrhea starts quickly and lasts a short time, usually 1 to 3 days. It can last up to 2 weeks. You may not be able to control your diarrhea. Acute diarrhea usually stops on its own.     DISCHARGE INSTRUCTIONS:    Return to the emergency department if:     You feel confused.       Your heartbeat is faster than usual.       Your eyes look deeply sunken, or you have no tears when you cry.       You urinate less than usual, or your urine is dark yellow.       You have blood or mucus in your bowel movements.      You have severe abdominal pain.       You are unable to drink any liquids.     Contact your healthcare provider if:     Your symptoms do not get better with treatment.       You have a fever higher than 101.3°F (38.5°C).       You have trouble eating and drinking because you are vomiting.       Your diarrhea does not get better in 7 days.       You have questions or concerns about your condition or care.     Follow up with your healthcare provider as directed: Write down your questions so you remember to ask them during your visits.     Medicines:    Diarrhea medicine is an over-the-counter medicine that helps slow or stop your diarrhea. Do not take this medicine unless your healthcare provider says it is okay.       Antibiotics may be given to help treat an infection caused by bacteria.       Antiparasitics may be given to treat an infection caused by parasites.       Take your medicine as directed. Contact your healthcare provider if you think your medicine is not helping or if you have side effects. Tell him of her if you are allergic to any medicine. Keep a list of the medicines, vitamins, and herbs you take. Include the amounts, and when and why you take them. Bring the list or the pill bottles to follow-up visits. Carry your medicine list with you in case of an emergency.    Self-care:     Drink liquids as directed. Liquids will help prevent dehydration caused by diarrhea. Ask your healthcare provider how much liquid to drink each day and which liquids are best for you. You may need to drink an oral rehydration solution (ORS). An ORS has the right amounts of water, salts, and sugar you need to replace body fluids. You can buy an ORS at most grocery stores and pharmacies.       Eat foods that are easy to digest. Examples include rice, lentils, cereal, bananas, potatoes, and bread. It also includes some fruits (bananas, melon), well-cooked vegetables, and lean meats. Do not eat foods high in fiber, fat, and sugar. Do not drink alcohol until your diarrhea is gone.     Prevent acute diarrhea:     Wash your hands often. Use soap and water. Wash your hands before you eat or prepare food. Also wash your hands after you use the bathroom. Use an alcohol-based hand gel when soap and water are not available. Handwashing           Keep bathroom surfaces clean. This helps prevent the spread of germs that cause acute diarrhea.       Wash fruits and vegetables well before you eat them. This can help remove germs that cause diarrhea. If possible, remove the skin from fruits and vegetables, or cook them well before you eat them.       Cook meat and poultry as directed. Meat includes beef and pork. Poultry includes chicken, turkey, and duck.  Cook ground meat to 160°F.       Cook ground poultry, whole poultry, or cuts of poultry to at least 165°F. Remove the poultry from heat. Let it stand for 3 minutes before you eat it.       Cook whole cuts of meat other than poultry to at least 145°F. Remove the meat from heat. Let it stand for 3 minutes before you eat it.       Wash dishes that have touched raw meat or poultry with hot water and soap. This includes cutting boards, utensils, dishes, and serving containers.       Place raw or cooked meat or poultry in the refrigerator as soon as possible. Bacteria can grow in meat or poultry that is left at room temperature too long.       Do not eat raw or undercooked oysters, clams, or mussels. These foods may be contaminated and cause infection.       Drink only filtered or treated water when you travel. Do not put ice in your drinks. Drink bottled water whenever possible.          © Copyright [employer] 2019 All illustrations and images included in CareNotes are the copyrighted property of [employer] Inc[employer] or [employer].      back to top                      © Copyright [employer] 2019

## 2019-10-13 NOTE — ED PROVIDER NOTE - OBJECTIVE STATEMENT
24 year old female presents to ED s/p appendectomy completed 10/10/19 with Dr. Blood complaining of diarrhea anytime she attempts to eat anything today.  Patient also noting dysuria over the past several days.  She denies associated fever, chills, chest pain, shortness of breath, increased abdominal pain, nausea, emesis, bright red blood per rectum, melena or any additional acute complaints or concerns at this time.

## 2019-10-13 NOTE — CONSULT NOTE ADULT - ASSESSMENT
Ms. Alejandra is a 23 yo F, POD2 from laparoscopic appendectomy, presenting with diarrhea likely secondary to Augmentin use.    - C diff negative  - Patient instructed to discontinue Augmentin.  - Rx Macrobid x 3 days for UTI (symptoms of dysuria and trace leukocyte esterase).  - Zofran PO x 7 days for nausea  - Patient instructed to return immediately to ED if persistent or worsening symptoms, including but not limited to nausea, emesis, persistent diarrhea, fever, or any other concerns.  - Plan discussed with attending and chief resident.

## 2019-10-13 NOTE — ED ADULT TRIAGE NOTE - CHIEF COMPLAINT QUOTE
Patient c/o diarrhea , nausea and abdominal pain  ( surgical site ) since last night . had appendectomy 10/10/19 . Denies any fever  ,chills nor vomiting .

## 2019-10-13 NOTE — ED ADULT NURSE NOTE - OBJECTIVE STATEMENT
Pt. s/p appendectomy 10/11 c/o non-bloody diarrhea and "stabbing" vaginal pain w/ full bladder since 10 pm last night. Pt. also reports malaise and dehydration. Denies any burning on urination, frequency, urgency. Pt. last took 2 tylenol at 545 this am and 2 motrin at 1720 last night. Denies fever, chills, body aches. Pt. w/ three steri strips of abdomen (above umbilical and R/L suprapubic), periumbilical ecchymosis noted, no drainage, erythema, or signs of infection noted. Parents at bedside.

## 2019-10-14 LAB
CULTURE RESULTS: NO GROWTH — SIGNIFICANT CHANGE UP
SPECIMEN SOURCE: SIGNIFICANT CHANGE UP

## 2019-10-15 LAB — SURGICAL PATHOLOGY STUDY: SIGNIFICANT CHANGE UP

## 2019-10-16 DIAGNOSIS — E28.2 POLYCYSTIC OVARIAN SYNDROME: ICD-10-CM

## 2019-10-16 DIAGNOSIS — K35.80 UNSPECIFIED ACUTE APPENDICITIS: ICD-10-CM

## 2019-10-16 DIAGNOSIS — K35.890 OTHER ACUTE APPENDICITIS WITHOUT PERFORATION OR GANGRENE: ICD-10-CM

## 2019-10-16 LAB
CULTURE RESULTS: NO GROWTH — SIGNIFICANT CHANGE UP
CULTURE RESULTS: NO GROWTH — SIGNIFICANT CHANGE UP
SPECIMEN SOURCE: SIGNIFICANT CHANGE UP
SPECIMEN SOURCE: SIGNIFICANT CHANGE UP

## 2019-10-18 DIAGNOSIS — R19.7 DIARRHEA, UNSPECIFIED: ICD-10-CM

## 2019-10-18 DIAGNOSIS — R30.0 DYSURIA: ICD-10-CM

## 2019-10-24 ENCOUNTER — APPOINTMENT (OUTPATIENT)
Dept: SURGICAL ONCOLOGY | Facility: HOSPITAL | Age: 24
End: 2019-10-24

## 2019-10-25 PROCEDURE — 85027 COMPLETE CBC AUTOMATED: CPT

## 2019-10-25 PROCEDURE — 86901 BLOOD TYPING SEROLOGIC RH(D): CPT

## 2019-10-25 PROCEDURE — 85730 THROMBOPLASTIN TIME PARTIAL: CPT

## 2019-10-25 PROCEDURE — 85025 COMPLETE CBC W/AUTO DIFF WBC: CPT

## 2019-10-25 PROCEDURE — 86900 BLOOD TYPING SEROLOGIC ABO: CPT

## 2019-10-25 PROCEDURE — 99285 EMERGENCY DEPT VISIT HI MDM: CPT | Mod: 25

## 2019-10-25 PROCEDURE — 87070 CULTURE OTHR SPECIMN AEROBIC: CPT

## 2019-10-25 PROCEDURE — 93005 ELECTROCARDIOGRAM TRACING: CPT

## 2019-10-25 PROCEDURE — 36415 COLL VENOUS BLD VENIPUNCTURE: CPT

## 2019-10-25 PROCEDURE — 80048 BASIC METABOLIC PNL TOTAL CA: CPT

## 2019-10-25 PROCEDURE — 84702 CHORIONIC GONADOTROPIN TEST: CPT

## 2019-10-25 PROCEDURE — 87205 SMEAR GRAM STAIN: CPT

## 2019-10-25 PROCEDURE — 85610 PROTHROMBIN TIME: CPT

## 2019-10-25 PROCEDURE — 84100 ASSAY OF PHOSPHORUS: CPT

## 2019-10-25 PROCEDURE — 88304 TISSUE EXAM BY PATHOLOGIST: CPT

## 2019-10-25 PROCEDURE — 86850 RBC ANTIBODY SCREEN: CPT

## 2019-10-25 PROCEDURE — 83735 ASSAY OF MAGNESIUM: CPT

## 2019-10-25 PROCEDURE — 87075 CULTR BACTERIA EXCEPT BLOOD: CPT

## 2019-12-02 ENCOUNTER — APPOINTMENT (OUTPATIENT)
Dept: NEUROLOGY | Facility: CLINIC | Age: 24
End: 2019-12-02

## 2020-10-02 ENCOUNTER — INPATIENT (INPATIENT)
Facility: HOSPITAL | Age: 25
LOS: 2 days | Discharge: HOME | End: 2020-10-05
Attending: PSYCHIATRY & NEUROLOGY | Admitting: PSYCHIATRY & NEUROLOGY
Payer: COMMERCIAL

## 2020-10-02 VITALS
HEART RATE: 119 BPM | TEMPERATURE: 98 F | HEIGHT: 70 IN | OXYGEN SATURATION: 98 % | SYSTOLIC BLOOD PRESSURE: 173 MMHG | DIASTOLIC BLOOD PRESSURE: 102 MMHG | RESPIRATION RATE: 18 BRPM

## 2020-10-02 DIAGNOSIS — F32.9 MAJOR DEPRESSIVE DISORDER, SINGLE EPISODE, UNSPECIFIED: ICD-10-CM

## 2020-10-02 LAB
ANION GAP SERPL CALC-SCNC: 15 MMOL/L — HIGH (ref 7–14)
APAP SERPL-MCNC: <5 UG/ML — LOW (ref 10–30)
APPEARANCE UR: CLEAR — SIGNIFICANT CHANGE UP
BASOPHILS # BLD AUTO: 0.05 K/UL — SIGNIFICANT CHANGE UP (ref 0–0.2)
BASOPHILS NFR BLD AUTO: 0.8 % — SIGNIFICANT CHANGE UP (ref 0–1)
BILIRUB UR-MCNC: NEGATIVE — SIGNIFICANT CHANGE UP
BUN SERPL-MCNC: 14 MG/DL — SIGNIFICANT CHANGE UP (ref 10–20)
CALCIUM SERPL-MCNC: 9.1 MG/DL — SIGNIFICANT CHANGE UP (ref 8.5–10.1)
CHLORIDE SERPL-SCNC: 103 MMOL/L — SIGNIFICANT CHANGE UP (ref 98–110)
CO2 SERPL-SCNC: 18 MMOL/L — SIGNIFICANT CHANGE UP (ref 17–32)
COLOR SPEC: SIGNIFICANT CHANGE UP
CREAT SERPL-MCNC: 1 MG/DL — SIGNIFICANT CHANGE UP (ref 0.7–1.5)
DIFF PNL FLD: SIGNIFICANT CHANGE UP
EOSINOPHIL # BLD AUTO: 0.13 K/UL — SIGNIFICANT CHANGE UP (ref 0–0.7)
EOSINOPHIL NFR BLD AUTO: 2.1 % — SIGNIFICANT CHANGE UP (ref 0–8)
ETHANOL SERPL-MCNC: <10 MG/DL — SIGNIFICANT CHANGE UP
GLUCOSE SERPL-MCNC: 88 MG/DL — SIGNIFICANT CHANGE UP (ref 70–99)
GLUCOSE UR QL: NEGATIVE — SIGNIFICANT CHANGE UP
HCG SERPL QL: NEGATIVE — SIGNIFICANT CHANGE UP
HCT VFR BLD CALC: 46.2 % — SIGNIFICANT CHANGE UP (ref 37–47)
HGB BLD-MCNC: 15.3 G/DL — SIGNIFICANT CHANGE UP (ref 12–16)
IMM GRANULOCYTES NFR BLD AUTO: 0.3 % — SIGNIFICANT CHANGE UP (ref 0.1–0.3)
KETONES UR-MCNC: ABNORMAL
LEUKOCYTE ESTERASE UR-ACNC: NEGATIVE — SIGNIFICANT CHANGE UP
LYMPHOCYTES # BLD AUTO: 2.53 K/UL — SIGNIFICANT CHANGE UP (ref 1.2–3.4)
LYMPHOCYTES # BLD AUTO: 39.9 % — SIGNIFICANT CHANGE UP (ref 20.5–51.1)
MCHC RBC-ENTMCNC: 29.4 PG — SIGNIFICANT CHANGE UP (ref 27–31)
MCHC RBC-ENTMCNC: 33.1 G/DL — SIGNIFICANT CHANGE UP (ref 32–37)
MCV RBC AUTO: 88.7 FL — SIGNIFICANT CHANGE UP (ref 81–99)
MONOCYTES # BLD AUTO: 0.51 K/UL — SIGNIFICANT CHANGE UP (ref 0.1–0.6)
MONOCYTES NFR BLD AUTO: 8 % — SIGNIFICANT CHANGE UP (ref 1.7–9.3)
NEUTROPHILS # BLD AUTO: 3.1 K/UL — SIGNIFICANT CHANGE UP (ref 1.4–6.5)
NEUTROPHILS NFR BLD AUTO: 48.9 % — SIGNIFICANT CHANGE UP (ref 42.2–75.2)
NITRITE UR-MCNC: NEGATIVE — SIGNIFICANT CHANGE UP
NRBC # BLD: 0 /100 WBCS — SIGNIFICANT CHANGE UP (ref 0–0)
PH UR: 6.5 — SIGNIFICANT CHANGE UP (ref 5–8)
PLATELET # BLD AUTO: 328 K/UL — SIGNIFICANT CHANGE UP (ref 130–400)
POTASSIUM SERPL-MCNC: 4 MMOL/L — SIGNIFICANT CHANGE UP (ref 3.5–5)
POTASSIUM SERPL-SCNC: 4 MMOL/L — SIGNIFICANT CHANGE UP (ref 3.5–5)
PROT UR-MCNC: NEGATIVE — SIGNIFICANT CHANGE UP
RAPID RVP RESULT: SIGNIFICANT CHANGE UP
RBC # BLD: 5.21 M/UL — SIGNIFICANT CHANGE UP (ref 4.2–5.4)
RBC # FLD: 12.3 % — SIGNIFICANT CHANGE UP (ref 11.5–14.5)
SALICYLATES SERPL-MCNC: <0.3 MG/DL — LOW (ref 4–30)
SARS-COV-2 RNA SPEC QL NAA+PROBE: SIGNIFICANT CHANGE UP
SODIUM SERPL-SCNC: 136 MMOL/L — SIGNIFICANT CHANGE UP (ref 135–146)
SP GR SPEC: 1.01 — SIGNIFICANT CHANGE UP (ref 1.01–1.03)
UROBILINOGEN FLD QL: SIGNIFICANT CHANGE UP
WBC # BLD: 6.34 K/UL — SIGNIFICANT CHANGE UP (ref 4.8–10.8)
WBC # FLD AUTO: 6.34 K/UL — SIGNIFICANT CHANGE UP (ref 4.8–10.8)

## 2020-10-02 PROCEDURE — 99285 EMERGENCY DEPT VISIT HI MDM: CPT

## 2020-10-02 PROCEDURE — 93010 ELECTROCARDIOGRAM REPORT: CPT | Mod: 76

## 2020-10-02 RX ORDER — HYDROXYZINE HCL 10 MG
50 TABLET ORAL EVERY 6 HOURS
Refills: 0 | Status: DISCONTINUED | OUTPATIENT
Start: 2020-10-02 | End: 2020-10-05

## 2020-10-02 RX ORDER — SERTRALINE 25 MG/1
25 TABLET, FILM COATED ORAL DAILY
Refills: 0 | Status: DISCONTINUED | OUTPATIENT
Start: 2020-10-02 | End: 2020-10-05

## 2020-10-02 NOTE — ED PROVIDER NOTE - PHYSICAL EXAMINATION
Physical Exam    Vital Signs: I have reviewed the initial vital signs.  Constitutional: well-nourished, appears stated age, no acute distress  Eyes: Conjunctiva pink, Sclera clear  Cardiovascular: S1 and S2, regular rate, regular rhythm, well-perfused extremities, radial pulses equal and 2+  Respiratory: unlabored respiratory effort, clear to auscultation bilaterally no wheezing, rales and rhonchi  Gastrointestinal: soft, non-tender abdomen, no pulsatile mass, normal bowl sounds  Musculoskeletal: supple neck, no lower extremity edema, no midline tenderness  Integumentary: warm, dry, no rash  Neurologic: awake, alert,   Psychiatric: appropriate mood, appropriate affect, tearful on exam.

## 2020-10-02 NOTE — ED BEHAVIORAL HEALTH ASSESSMENT NOTE - DESCRIPTION
cooperative Hypothyroidism , POCS  , [id number] single ,   working form home .   living with  her family
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
## 2020-10-02 NOTE — ED ADULT NURSE REASSESSMENT NOTE - NS ED NURSE REASSESS COMMENT FT1
Accepted at [facility] , report given to [facility] nurse , transport called / ordered by unit clerk , pt AO x 4 , no SOB , denies any pain , PCA at the  bedside, continue constant observation as ordered

## 2020-10-02 NOTE — ED ADULT NURSE NOTE - NS TRANSFER PATIENT BELONGINGS
Cell Phone/PDA (specify)/[employer] fit bit/ iphone and iphoen ; sneakers; blackwlaletw/ 70$ and credit cards and blackthermos/Jewelry/Money (specify)/Clothing
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
## 2020-10-02 NOTE — ED BEHAVIORAL HEALTH ASSESSMENT NOTE - DETAILS
pt has sucidal thoughts , but  is willing to get help allergy  to PCN , clindamycin grandmother  form mother side   h/o depression PCN , Clindamycin on call psychiatrist Ed staff

## 2020-10-02 NOTE — ED PROVIDER NOTE - NS ED ROS FT
Constitutional: (-) fever, (-) chills  Eyes: (-) visual changes  ENT: (-) nasal congestions  Cardiovascular: (-) chest pain, (-) syncope  Respiratory: (-) cough, (-) shortness of breath, (-) dyspnea,   Gastrointestinal: (-) vomiting, (-) diarrhea, (-)nausea,  Musculoskeletal: (-) neck pain, (-) back pain, (-) joint pain,  Integumentary: (-) rash, (-) edema, (-) bruises  Neurological: (-) headache, (-) loc, (-) dizziness, (-) tingling, (-)numbness,  Psychiatric: (-) hallucinations, (-)nervousness, (-)depression, (+)SI, (-)HI  Peripheral Vascular: (-) leg swelling  :  (-)dysuria,  (-) hematuria  Allergic/Immunologic: (-) pruritus

## 2020-10-02 NOTE — ED ADULT NURSE NOTE - NS ED NURSE TRANSFER FORMS DT
Patient instructed to follow all orders given by MD for NPO status and meds to hold  or take prior to procedure. Teaching reinforced re:antibacterial shower, arrival time, department location/ parking, need for ride home, leave valuables at home , wear clean, comfortable clothes and to bring overnight bag, insurance and photo cards. Verbalizes understanding.   03-Oct-2020 23:00

## 2020-10-02 NOTE — ED PROVIDER NOTE - PROGRESS NOTE DETAILS
psych aware. Psych Dr. Barrera at bedside. Psych Dr. Rousseau at bedside. Case d/w Dr. Rousseau - will admit

## 2020-10-03 DIAGNOSIS — F12.10 CANNABIS ABUSE, UNCOMPLICATED: ICD-10-CM

## 2020-10-03 DIAGNOSIS — F32.9 MAJOR DEPRESSIVE DISORDER, SINGLE EPISODE, UNSPECIFIED: ICD-10-CM

## 2020-10-03 PROBLEM — E72.12 METHYLENETETRAHYDROFOLATE REDUCTASE DEFICIENCY: Chronic | Status: ACTIVE | Noted: 2019-10-10

## 2020-10-03 PROCEDURE — 99232 SBSQ HOSP IP/OBS MODERATE 35: CPT

## 2020-10-03 RX ORDER — LIOTHYRONINE SODIUM 25 UG/1
5 TABLET ORAL DAILY
Refills: 0 | Status: DISCONTINUED | OUTPATIENT
Start: 2020-10-03 | End: 2020-10-03

## 2020-10-03 RX ORDER — NICOTINE POLACRILEX 2 MG
2 GUM BUCCAL THREE TIMES A DAY
Refills: 0 | Status: DISCONTINUED | OUTPATIENT
Start: 2020-10-03 | End: 2020-10-03

## 2020-10-03 RX ORDER — METFORMIN HYDROCHLORIDE 850 MG/1
500 TABLET ORAL
Refills: 0 | Status: DISCONTINUED | OUTPATIENT
Start: 2020-10-03 | End: 2020-10-05

## 2020-10-03 RX ORDER — NORETHINDRONE 0.35 MG/1
5 TABLET ORAL DAILY
Refills: 0 | Status: DISCONTINUED | OUTPATIENT
Start: 2020-10-03 | End: 2020-10-05

## 2020-10-03 RX ORDER — ASPIRIN/CALCIUM CARB/MAGNESIUM 324 MG
81 TABLET ORAL DAILY
Refills: 0 | Status: DISCONTINUED | OUTPATIENT
Start: 2020-10-03 | End: 2020-10-05

## 2020-10-03 RX ORDER — HYDROCORTISONE 1 %
1 OINTMENT (GRAM) TOPICAL
Refills: 0 | Status: DISCONTINUED | OUTPATIENT
Start: 2020-10-03 | End: 2020-10-05

## 2020-10-03 RX ORDER — INFLUENZA VIRUS VACCINE 15; 15; 15; 15 UG/.5ML; UG/.5ML; UG/.5ML; UG/.5ML
0.5 SUSPENSION INTRAMUSCULAR ONCE
Refills: 0 | Status: COMPLETED | OUTPATIENT
Start: 2020-10-03 | End: 2020-10-03

## 2020-10-03 RX ORDER — ASPIRIN/CALCIUM CARB/MAGNESIUM 324 MG
81 TABLET ORAL DAILY
Refills: 0 | Status: DISCONTINUED | OUTPATIENT
Start: 2020-10-03 | End: 2020-10-03

## 2020-10-03 RX ORDER — LIOTHYRONINE SODIUM 25 UG/1
10 TABLET ORAL DAILY
Refills: 0 | Status: DISCONTINUED | OUTPATIENT
Start: 2020-10-03 | End: 2020-10-05

## 2020-10-03 RX ADMIN — LIOTHYRONINE SODIUM 5 MICROGRAM(S): 25 TABLET ORAL at 13:04

## 2020-10-03 RX ADMIN — Medication 1 APPLICATION(S): at 21:26

## 2020-10-03 RX ADMIN — Medication 5 MILLIGRAM(S): at 21:41

## 2020-10-03 RX ADMIN — Medication 1 APPLICATION(S): at 02:19

## 2020-10-03 RX ADMIN — Medication 81 MILLIGRAM(S): at 02:19

## 2020-10-03 RX ADMIN — METFORMIN HYDROCHLORIDE 500 MILLIGRAM(S): 850 TABLET ORAL at 15:30

## 2020-10-03 NOTE — CONSULT NOTE ADULT - SUBJECTIVE AND OBJECTIVE BOX
SOPHIE ULLOA  25y  Female      Patient is a 25y old Female who presents with a chief complaint of SI, increasing hopelessness (03 Oct 2020 01:27)      INTERVAL HPI/OVERNIGHT EVENTS:  Patient seen and examined earlier this morning.  Sitting in the chair. In NAD. No complaints.        REVIEW OF SYSTEMS:  CONSTITUTIONAL: No fever, weight loss, or fatigue  EYES: No eye pain, visual disturbances, or discharge  ENMT:  No difficulty hearing, tinnitus, vertigo; No sinus or throat pain  NECK: No pain or stiffness  RESPIRATORY: No cough, wheezing, chills or hemoptysis; No shortness of breath  CARDIOVASCULAR: No chest pain, palpitations, dizziness, or leg swelling  GASTROINTESTINAL: No abdominal or epigastric pain. No nausea, vomiting, or hematemesis; No diarrhea or constipation. No melena or hematochezia.  GENITOURINARY: No dysuria, frequency, hematuria, or incontinence  NEUROLOGICAL: No headaches, memory loss, loss of strength, numbness, or tremors  SKIN: No itching, burning, rashes, or lesions   LYMPH NODES: No enlarged glands  ENDOCRINE: No heat or cold intolerance; No hair loss  MUSCULOSKELETAL: No joint pain or swelling; No muscle, back, or extremity pain  PSYCHIATRIC: No depression, anxiety, mood swings, or difficulty sleeping  HEME/LYMPH: No easy bruising, or bleeding gums  ALLERY AND IMMUNOLOGIC: No hives or eczema    T(C): 37.3 (10-03-20 @ 01:03), Max: 37.3 (10-03-20 @ 01:03)  HR: 95 (10-03-20 @ 01:03) (95 - 119)  BP: 137/80 (10-03-20 @ 01:03) (118/79 - 173/102)  RR: 16 (10-03-20 @ 01:03) (16 - 18)  SpO2: 97% (10-02-20 @ 23:26) (97% - 100%)    PHYSICAL EXAM:  GENERAL: NAD, well-groomed, well-developed  HEAD:  Atraumatic, Normocephalic  EYES: EOMI, PERRLA, conjunctiva and sclera clear  ENMT: No tonsillar erythema, exudates, or enlargement; Moist mucous membranes, Good dentition, No lesions  NECK: Supple, No JVD, Normal thyroid  NERVOUS SYSTEM:  Alert & Oriented X3, Good concentration; Motor Strength 5/5 B/L upper and lower extremities; DTRs 2+ intact and symmetric  CHEST/LUNG: Clear to percussion bilaterally; No rales, rhonchi, wheezing, or rubs  HEART: Regular rate and rhythm; No murmurs, rubs, or gallops  ABDOMEN: Soft, Nontender, Nondistended; Bowel sounds present  EXTREMITIES:  2+ Peripheral Pulses, No clubbing, cyanosis, or edema  LYMPH: No lymphadenopathy noted  SKIN: No rashes or lesions    Consultant(s) Notes Reviewed:  [x ] YES  [ ] NO  Care Discussed with Consultants/Other Providers [ x] YES  [ ] NO    LAB:                        15.3   6.34  )-----------( 328      ( 02 Oct 2020 17:11 )             46.2     10    136  |  103  |  14  ----------------------------<  88  4.0   |  18  |  1.0    Ca    9.1      02 Oct 2020 17:11          Drug Dosing Weight  Height (cm): 177.8 (03 Oct 2020 01:03)  Weight (kg): 74.2 (03 Oct 2020 01:03)  BMI (kg/m2): 23.5 (03 Oct 2020 01:03)  BSA (m2): 1.92 (03 Oct 2020 01:03)        Urinalysis Basic - ( 02 Oct 2020 20:30 )    Color: Light Yellow / Appearance: Clear / S.009 / pH: x  Gluc: x / Ketone: Small  / Bili: Negative / Urobili: <2 mg/dL   Blood: x / Protein: Negative / Nitrite: Negative   Leuk Esterase: Negative / RBC: x / WBC x   Sq Epi: x / Non Sq Epi: x / Bacteria: x        RADIOLOGY & ADDITIONAL TESTS:  Imaging Personally Reviewed:  [x] YES  [ ] NO    HEALTH ISSUES - PROBLEM Dx:  Depressive disorder  Depressive disorder    Depression        MEDS:  aspirin enteric coated 81 milliGRAM(s) Oral daily  hydrocortisone 1% Cream 1 Application(s) Topical two times a day  hydrOXYzine hydrochloride 50 milliGRAM(s) Oral every 6 hours PRN  influenza   Vaccine 0.5 milliLiter(s) IntraMuscular once  liothyronine 5 MICROGram(s) Oral daily  metFORMIN 500 milliGRAM(s) Oral two times a day with meals  sertraline Oral Tab/Cap - Peds 25 milliGRAM(s) Oral daily

## 2020-10-03 NOTE — H&P ADULT - NSHPPHYSICALEXAM_GEN_ALL_CORE
Vital Signs Last 24 Hrs  T(C): 37.3 (10-03-20 @ 01:03)  T(F): 99.1 (10-03-20 @ 01:03), Max: 99.1 (10-03-20 @ 01:03)  HR: 95 (10-03-20 @ 01:03) (95 - 119)  BP: 137/80 (10-03-20 @ 01:03)  BP(mean): --  RR: 16 (10-03-20 @ 01:03) (16 - 18)  SpO2: 97% (10-02-20 @ 23:26) (97% - 100%)  Wt(kg): --

## 2020-10-03 NOTE — H&P ADULT - NSHPLABSRESULTS_GEN_ALL_CORE
15.3   6.34  )-----------( 328      ( 02 Oct 2020 17:11 )             46.2       10    136  |  103  |  14  ----------------------------<  88  4.0   |  18  |  1.0    Ca    9.1      02 Oct 2020 17:11                Urinalysis Basic - ( 02 Oct 2020 20:30 )    Color: Light Yellow / Appearance: Clear / S.009 / pH: x  Gluc: x / Ketone: Small  / Bili: Negative / Urobili: <2 mg/dL   Blood: x / Protein: Negative / Nitrite: Negative   Leuk Esterase: Negative / RBC: x / WBC x   Sq Epi: x / Non Sq Epi: x / Bacteria: x            Lactate Trend            CAPILLARY BLOOD GLUCOSE

## 2020-10-03 NOTE — CONSULT NOTE ADULT - ASSESSMENT
25 year old female presents with depression    #Depression  -management as per psychiatry     #MTHFR gene mutation  #PCOS  #Thyroid disorder  -continue current management    Please call with any medical questions

## 2020-10-03 NOTE — ED ADULT NURSE REASSESSMENT NOTE - NS ED NURSE REASSESS COMMENT FT1
transported to [facility] as ordered via ambulance , pt calm , no agitation noted , no restlessness noted, AO  x 4 , no SOB , v/s stable , no hallucination noted

## 2020-10-03 NOTE — CHART NOTE - NSCHARTNOTEFT_GEN_A_CORE
pt requesting continuation of her home medication  some of her home meds non formulary  will order home meds  monitor vss  monitor pt

## 2020-10-04 LAB
CHOLEST SERPL-MCNC: 193 MG/DL — SIGNIFICANT CHANGE UP (ref 100–200)
HDLC SERPL-MCNC: 42 MG/DL — LOW
LIPID PNL WITH DIRECT LDL SERPL: 133 MG/DL — HIGH (ref 4–129)
SARS-COV-2 IGG SERPL QL IA: NEGATIVE — SIGNIFICANT CHANGE UP
SARS-COV-2 IGM SERPL IA-ACNC: 0.25 INDEX — SIGNIFICANT CHANGE UP
TOTAL CHOLESTEROL/HDL RATIO MEASUREMENT: 4.6 RATIO — SIGNIFICANT CHANGE UP (ref 4–5.5)
TRIGL SERPL-MCNC: 84 MG/DL — SIGNIFICANT CHANGE UP (ref 10–149)
TSH SERPL-MCNC: 1.1 UIU/ML — SIGNIFICANT CHANGE UP (ref 0.27–4.2)

## 2020-10-04 PROCEDURE — 99232 SBSQ HOSP IP/OBS MODERATE 35: CPT

## 2020-10-04 RX ADMIN — Medication 5 MILLIGRAM(S): at 13:11

## 2020-10-04 RX ADMIN — Medication 81 MILLIGRAM(S): at 08:49

## 2020-10-04 RX ADMIN — METFORMIN HYDROCHLORIDE 500 MILLIGRAM(S): 850 TABLET ORAL at 08:49

## 2020-10-04 RX ADMIN — NORETHINDRONE 5 MILLIGRAM(S): 0.35 TABLET ORAL at 17:32

## 2020-10-04 RX ADMIN — METFORMIN HYDROCHLORIDE 500 MILLIGRAM(S): 850 TABLET ORAL at 17:31

## 2020-10-04 RX ADMIN — SERTRALINE 25 MILLIGRAM(S): 25 TABLET, FILM COATED ORAL at 15:26

## 2020-10-04 RX ADMIN — Medication 1 APPLICATION(S): at 08:49

## 2020-10-04 RX ADMIN — Medication 5 MILLIGRAM(S): at 20:49

## 2020-10-04 RX ADMIN — Medication 1 APPLICATION(S): at 20:50

## 2020-10-04 RX ADMIN — LIOTHYRONINE SODIUM 10 MICROGRAM(S): 25 TABLET ORAL at 08:50

## 2020-10-04 RX ADMIN — Medication 5 MILLIGRAM(S): at 08:49

## 2020-10-04 NOTE — PROGRESS NOTE ADULT - ASSESSMENT
25 year old female presents with depression    #Depression  -management as per psychiatry   -started buspirone    #MTHFR gene mutation  #PCOS  #Thyroid disorder  -continue current meds  -outpatient follow up    Please call with any medical questions

## 2020-10-04 NOTE — PROGRESS NOTE ADULT - SUBJECTIVE AND OBJECTIVE BOX
SOPHIE ULLOA  25y  Female      Patient is a 25y old Female who presents with a chief complaint of SI, increasing hopelessness (03 Oct 2020 01:27)      INTERVAL HPI/OVERNIGHT EVENTS:  Patient seen and examined earlier this morning.  Sitting in her bed. In NAD. No complaints.  Reportedly taking her meds      REVIEW OF SYSTEMS:  CONSTITUTIONAL: No fever, weight loss, or fatigue  EYES: No eye pain, visual disturbances, or discharge  ENMT:  No difficulty hearing, tinnitus, vertigo; No sinus or throat pain  NECK: No pain or stiffness  RESPIRATORY: No cough, wheezing, chills or hemoptysis; No shortness of breath  CARDIOVASCULAR: No chest pain, palpitations, dizziness, or leg swelling  GASTROINTESTINAL: No abdominal or epigastric pain. No nausea, vomiting, or hematemesis; No diarrhea or constipation. No melena or hematochezia.  GENITOURINARY: No dysuria, frequency, hematuria, or incontinence  NEUROLOGICAL: No headaches, memory loss, loss of strength, numbness, or tremors  SKIN: No itching, burning, rashes, or lesions   LYMPH NODES: No enlarged glands  ENDOCRINE: No heat or cold intolerance; No hair loss  MUSCULOSKELETAL: No joint pain or swelling; No muscle, back, or extremity pain  PSYCHIATRIC: No depression, anxiety, mood swings, or difficulty sleeping  HEME/LYMPH: No easy bruising, or bleeding gums  ALLERY AND IMMUNOLOGIC: No hives or eczema    Vital Signs Last 24 Hrs  T(C): 36.2 (04 Oct 2020 08:43), Max: 36.7 (03 Oct 2020 15:51)  T(F): 97.1 (04 Oct 2020 08:43), Max: 98 (03 Oct 2020 15:51)  HR: 105 (04 Oct 2020 08:43) (98 - 105)  BP: 108/84 (04 Oct 2020 08:43) (108/84 - 127/89)  BP(mean): --  RR: 18 (04 Oct 2020 08:43) (16 - 18)  SpO2: --    PHYSICAL EXAM:  GENERAL: NAD, well-groomed, well-developed  HEAD:  Atraumatic, Normocephalic  EYES: EOMI, PERRLA, conjunctiva and sclera clear  ENMT: No tonsillar erythema, exudates, or enlargement; Moist mucous membranes, Good dentition, No lesions  NECK: Supple, No JVD, Normal thyroid  NERVOUS SYSTEM:  Alert & Oriented X3, Good concentration; Motor Strength 5/5 B/L upper and lower extremities; DTRs 2+ intact and symmetric  CHEST/LUNG: Clear to percussion bilaterally; No rales, rhonchi, wheezing, or rubs  HEART: Regular rate and rhythm; No murmurs, rubs, or gallops  ABDOMEN: Soft, Nontender, Nondistended; Bowel sounds present  EXTREMITIES:  2+ Peripheral Pulses, No clubbing, cyanosis, or edema  LYMPH: No lymphadenopathy noted  SKIN: No rashes or lesions    Consultant(s) Notes Reviewed:  [x ] YES  [ ] NO  Care Discussed with Consultants/Other Providers [ x] YES  [ ] NO    LAB:                        15.3   6.34  )-----------( 328      ( 02 Oct 2020 17:11 )             46.2     10    136  |  103  |  14  ----------------------------<  88  4.0   |  18  |  1.0    Ca    9.1      02 Oct 2020 17:11          Drug Dosing Weight  Height (cm): 177.8 (03 Oct 2020 01:03)  Weight (kg): 74.2 (03 Oct 2020 01:03)  BMI (kg/m2): 23.5 (03 Oct 2020 01:03)  BSA (m2): 1.92 (03 Oct 2020 01:03)        Urinalysis Basic - ( 02 Oct 2020 20:30 )    Color: Light Yellow / Appearance: Clear / S.009 / pH: x  Gluc: x / Ketone: Small  / Bili: Negative / Urobili: <2 mg/dL   Blood: x / Protein: Negative / Nitrite: Negative   Leuk Esterase: Negative / RBC: x / WBC x   Sq Epi: x / Non Sq Epi: x / Bacteria: x        RADIOLOGY & ADDITIONAL TESTS:  Imaging Personally Reviewed:  [x] YES  [ ] NO    HEALTH ISSUES - PROBLEM Dx:  Depressive disorder  Depressive disorder    Depression        MEDICATIONS  (STANDING):  aspirin enteric coated 81 milliGRAM(s) Oral daily  busPIRone 5 milliGRAM(s) Oral three times a day  hydrocortisone 1% Cream 1 Application(s) Topical two times a day  influenza   Vaccine 0.5 milliLiter(s) IntraMuscular once  liothyronine 10 MICROGram(s) Oral daily  metFORMIN 500 milliGRAM(s) Oral two times a day with meals  Methyl Balance 1 Tablet(s) 1 Tablet(s) Oral daily  norethindrone acetate 5 milliGRAM(s) Oral daily  sertraline Oral Tab/Cap - Peds 25 milliGRAM(s) Oral daily    MEDICATIONS  (PRN):  hydrOXYzine hydrochloride 50 milliGRAM(s) Oral every 6 hours PRN Anxiety

## 2020-10-05 VITALS
SYSTOLIC BLOOD PRESSURE: 108 MMHG | RESPIRATION RATE: 18 BRPM | TEMPERATURE: 98 F | HEART RATE: 79 BPM | DIASTOLIC BLOOD PRESSURE: 71 MMHG

## 2020-10-05 DIAGNOSIS — F32.0 MAJOR DEPRESSIVE DISORDER, SINGLE EPISODE, MILD: ICD-10-CM

## 2020-10-05 DIAGNOSIS — F43.21 ADJUSTMENT DISORDER WITH DEPRESSED MOOD: ICD-10-CM

## 2020-10-05 LAB
A1C WITH ESTIMATED AVERAGE GLUCOSE RESULT: 4.7 % — SIGNIFICANT CHANGE UP (ref 4–5.6)
ESTIMATED AVERAGE GLUCOSE: 88 MG/DL — SIGNIFICANT CHANGE UP (ref 68–114)

## 2020-10-05 PROCEDURE — 99238 HOSP IP/OBS DSCHRG MGMT 30/<: CPT

## 2020-10-05 RX ORDER — METFORMIN HYDROCHLORIDE 850 MG/1
1 TABLET ORAL
Qty: 0 | Refills: 0 | DISCHARGE

## 2020-10-05 RX ORDER — LIOTHYRONINE SODIUM 25 UG/1
10 TABLET ORAL
Qty: 0 | Refills: 0 | DISCHARGE

## 2020-10-05 RX ORDER — NORETHINDRONE 0.35 MG/1
1 TABLET ORAL
Qty: 0 | Refills: 0 | DISCHARGE

## 2020-10-05 RX ORDER — ASPIRIN/CALCIUM CARB/MAGNESIUM 324 MG
1 TABLET ORAL
Qty: 0 | Refills: 0 | DISCHARGE

## 2020-10-05 RX ORDER — HYDROCORTISONE 1 %
1 OINTMENT (GRAM) TOPICAL
Qty: 0 | Refills: 0 | DISCHARGE
Start: 2020-10-05

## 2020-10-05 RX ADMIN — Medication 1 APPLICATION(S): at 09:11

## 2020-10-05 RX ADMIN — Medication 5 MILLIGRAM(S): at 13:01

## 2020-10-05 RX ADMIN — LIOTHYRONINE SODIUM 10 MICROGRAM(S): 25 TABLET ORAL at 09:14

## 2020-10-05 RX ADMIN — Medication 81 MILLIGRAM(S): at 09:11

## 2020-10-05 RX ADMIN — Medication 5 MILLIGRAM(S): at 09:11

## 2020-10-05 RX ADMIN — METFORMIN HYDROCHLORIDE 500 MILLIGRAM(S): 850 TABLET ORAL at 09:11

## 2020-10-05 NOTE — PROGRESS NOTE BEHAVIORAL HEALTH - NSBHCHARTREVIEWLAB_PSY_A_CORE FT
Lipid Profile (10.03.20 @ 07:46)    Total Cholesterol/HDL Ratio Measurement: 4.6 Ratio    Cholesterol, Serum: 193 mg/dL    Triglycerides, Serum: 84 mg/dL    HDL Cholesterol, Serum: 42: HDL Levels >/= 60 mg/dL are considered beneficial and a "negative" risk  factor.  Effective 08/15/2018: New reference range and interpretive comment. mg/dL    Direct LDL: 133    Thyroid Stimulating Hormone, Serum (10.03.20 @ 07:40)    Thyroid Stimulating Hormone, Serum: 1.10 uIU/mL    A1C with Estimated Average Glucose (10.03.20 @ 07:40)    A1C with Estimated Average Glucose Result: 4.7    Estimated Average Glucose: 88

## 2020-10-05 NOTE — PROGRESS NOTE BEHAVIORAL HEALTH - NSBHCHARTREVIEWINVESTIGATE_PSY_A_CORE FT
< from: 12 Lead ECG (10.02.20 @ 18:39) >    Ventricular Rate 106 BPM    Atrial Rate 106 BPM    P-R Interval 138 ms    QRS Duration 78 ms    Q-T Interval 340 ms    QTC Calculation(Bazett) 451 ms    P Axis 85 degrees    R Axis 60 degrees    T Axis 54 degrees    Diagnosis Line Sinus tachycardia  Otherwise normal ECG    Confirmed by Tonny Larios ([id number]) on 10/4/2020 9:49:33 PM    < end of copied text >

## 2020-10-05 NOTE — DISCHARGE NOTE BEHAVIORAL HEALTH - FAMILY HISTORY OF PSYCHIATRIC ILLNESS
Patient moved back in with family during COVID, and has been living with her two parents and sibling. 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 Recently had a breakup with her BF.    Mother with history of SYLVIE, maternal grandmother with depression.

## 2020-10-05 NOTE — CHART NOTE - NSCHARTNOTEFT_GEN_A_CORE
Social Work Admit Note:    Patient is 25 years of age female who was admitted for evaluation of depression and suicidal ideation.  She was brought to the hospital by her mother after she made suicidal threats of killing herself by overdose of pills.  Stressors identified included a family fight one month ago and more recently a break up with her boyfriend.  Symptoms endorsed included poor sleep and poor appetite and feeling hopeless.      In the community patient resides in a private residence with her parents and older sister.  She is employed.  Marital status is single.  No prior inpatient psychiatric treatment and no outpatient mental health provider.      Sexual History – patient identifies as heterosexual.   	  Family relationships and history – Patient is single and lives with her family     Leisure Activity Assessment – spending time with her family     Community Supports – No known attendance in any self- help groups or other organizations.     Employment – patient is employed    Substance Use Assessment – use of alcohol or other substances denied.      History of suicidality or self- injurious behaviors – no known history    Significant Loses – recent break up with boyfriend.     Life Goals – patient verbalized goal of returning to work      will continue to meet with patient 1:1 and with treatment team daily.  Discharge plan is for continued mental health treatment in outpatient setting.      Please refer to Social Work Psychosocial for additional information.

## 2020-10-05 NOTE — DISCHARGE NOTE BEHAVIORAL HEALTH - NSBHDCAGENCY1FT_PSY_A_CORE
[facility]

## 2020-10-05 NOTE — PROGRESS NOTE BEHAVIORAL HEALTH - NSBHFUPINTERVALCCFT_PSY_A_CORE
I have taken medication. I felt sleepy  'pt stated.
"I'm feeling so much better."
pt stated , she has been feeling good. has no anxiety and she has no s/i and wants to be released.

## 2020-10-05 NOTE — DISCHARGE NOTE BEHAVIORAL HEALTH - PAST PSYCHIATRIC HISTORY
Patient endorses seeing a psychiatrist once in college when she was diagnosed with ADHD, dyslexia, and anxiety, and was started on Klonopin. She took it briefly but did not find it helpful and self-discontinued. She speaks with a psychotherapist regularly but not currently seeing any psychiatrist.

## 2020-10-05 NOTE — DISCHARGE NOTE BEHAVIORAL HEALTH - PROVIDER TOKENS
FREE:[LAST:[[facility]],PHONE:[phone]],FAX:[(   [phone]    -],ADDRESS:[[address] [place] [address]]]

## 2020-10-05 NOTE — DISCHARGE NOTE BEHAVIORAL HEALTH - NSBHDCMEDICALFT_PSY_A_CORE
#MTHFR gene mutation  #PCOS  #Thyroid disorder  -continue current meds  -outpatient follow up #MTHFR gene mutation  - ASA 81mg daily  #PCOS  - Metformin 500mg BID  #Thyroid disorder  - Liothyronine 10mg daily

## 2020-10-05 NOTE — PROGRESS NOTE BEHAVIORAL HEALTH - AXIS III
Hypothyroidism ,  PCOS,   Endometriosis

## 2020-10-05 NOTE — DISCHARGE NOTE BEHAVIORAL HEALTH - NSBHDCRESPONSEFT_PSY_A_CORE
Patient has made significant progress over the course of hospitalization. With psychotherapy and medications, patient reports feeling better, sleeping, and eating well. Thought process and insight improved. Patient was calm and cooperative and was in good behavioral control. Patient denied any suicidal or homicidal ideations. Patient denied any auditory of visual hallucinations. Pt was evaluated by treatment team, pt is stable for discharge and shows no imminent risk to self, others, or property at this item. Patient's family felt comfortable taking her home, and reported that patient appeared to be at baseline and did not have any safety concerns. Patient has made significant progress over the course of hospitalization. With psychotherapy and medications, patient reports feeling better, sleeping, and eating well. Thought process and insight improved. Patient was calm and cooperative and was in good behavioral control. Patient denied any suicidal or homicidal ideations. Stated that she has not had any suicidal thoughts since coming to the hospital, pt is future oriented. Patient denied any auditory of visual hallucinations. Pt was visible on the unit, attending groups. Reported that family has been visiting her while in the hospital, which has been helpful. Pt was evaluated by treatment team, pt is stable for discharge and shows no imminent risk to self, others, or property at this item. Patient's family felt comfortable taking her home, and reported that patient appeared to be at baseline and did not have any safety concerns.

## 2020-10-05 NOTE — DISCHARGE NOTE BEHAVIORAL HEALTH - MEDICATION SUMMARY - MEDICATIONS TO TAKE
I will START or STAY ON the medications listed below when I get home from the hospital:    freetext medication     - Methyl Balance  -- 1 tab(s) by mouth once a day  -- Indication: For Nutrition    aspirin 81 mg oral delayed release tablet  -- 1 tab(s) by mouth once a day  -- Indication: For MTHFR gene mutation    metFORMIN 500 mg oral tablet  -- 1 tab(s) by mouth 2 times a day  -- Indication: For PCOS    busPIRone 5 mg oral tablet  -- 1 tab(s) by mouth 3 times a day x 14 days   -- Indication: For Anxiety    hydrocortisone 1% topical cream  -- 1 application on skin 2 times a day  -- Indication: For Dermatologic    norethindrone 5 mg oral tablet  -- 1 tab(s) by mouth once a day  -- Indication: For Oral contraceptive    liothyronine 5 mcg oral tablet  -- 10 microgram(s) by mouth once a day  -- Indication: For Hypothyroidism   I will START or STAY ON the medications listed below when I get home from the hospital:    freetext medication     - Methyl Balance  -- 1 tab(s) by mouth once a day. Continue to take until told otherwise by your provider. Pt has supply at home.   -- Indication: For nutrition    aspirin 81 mg oral delayed release tablet  -- 1 tab(s) by mouth once a day. Continue to take until told otherwise by your provider. Pt has supply at home.   -- Indication: For MTHFR gene mutation    metFORMIN 500 mg oral tablet  -- 1 tab(s) by mouth 2 times a day. Continue to take until told otherwise by your provider. Pt has supply at home.   -- Indication: For pcos    busPIRone 5 mg oral tablet  -- 1 tab(s) by mouth 3 times a day x 14 days   -- Indication: For Anxiety    hydrocortisone 1% topical cream  -- 1 application on skin 2 times a day. Continue to take until told otherwise by your provider. Pt has supply at home.   -- Indication: For Dermatology     norethindrone 5 mg oral tablet  -- 1 tab(s) by mouth once a day. Continue to take until told otherwise by your provider. Pt has supply at home.   -- Indication: For Contraception     liothyronine 5 mcg oral tablet  -- 10 microgram(s) by mouth once a day. Continue to take until told otherwise by your provider. Pt has supply at home.   -- Indication: For hypothyrodism

## 2020-10-05 NOTE — PROGRESS NOTE BEHAVIORAL HEALTH - CASE SUMMARY
Pt was seen, evaluated and discussed with the resident, Dr. Cardenas. Chart reviewed. 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 She was started on Zoloft and Buspar once admitted, but decided to only try one medication, Buspar for the time being to help with her anxiety. On evaluation, pt reported that she had quick suicidal thoughts on the day of admission but never had any intensions and knows that she would never want to hurt herself which is why she brought herself to the hospital. Reports that she is feeling better. She just felt that her family did not care at the moment and they have since proven to her that they care. She reports family visited over the weekend. Pt endorsed feeling better. Reports Buspar has been helpful for anxiety. States she has not had any suicidal thoguhts since coming to the hospital. She is future oriented, looking forward to going back to work, taking a 5 mile walk prior to working every morning. She enjoys eating healthy. States sleep and appetite have been good. Pt put in a 72hr discharge letter on 10/3/20 at 1:00AM. There are no psychiatric contraindications to discharge at this time. Pt's family feel safe taking pt home and do not have any safety concerns. Denied A/V hallucinations. Denied paranoia. Denied suicidal/homicidal ideation, intent or plan. Agree with assessment and plan above. Continue with medications as above.

## 2020-10-05 NOTE — PROGRESS NOTE BEHAVIORAL HEALTH - SUMMARY
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 She was started on Zoloft and Buspar once admitted, but decided to only try one medication, Buspar for the time being to help with her anxiety. On assessment today, patient has shown marked improvement in symptoms, denying any SI/I/P. Her mother is in agreement that patient's symptoms appear to have improved and is comfortable taking her home at this time. Discharge is for today.    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 She was started on Zoloft and Buspar once admitted, but decided to only try one medication, Buspar for the time being to help with her anxiety. On assessment today, patient has shown marked improvement in symptoms, denying any SI/I/P. Her mother is in agreement that patient's symptoms appear to have improved and is comfortable taking her home at this time. Discharge is for today.    #Depression and Anxiety  - Patient to continue Buspar 5mg TID  - Discontinue Zoloft  - Continue DBT, milieu, and supportive therapy    Pt is for discharge today as she put in a 72hr discharge letter on 10/3/20 at 1:00AM.

## 2020-10-05 NOTE — PROGRESS NOTE BEHAVIORAL HEALTH - NSBHFUPADDHPIFT_PSY_A_CORE
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 As per mother in the ED, patient texted her friend, making suicidal threats of killing herself by OD on pills. Patient reports recent breakup with BF 2wks ago, as well as moving back home to live with family during COVID and working from home, all of which have been recent stressors for her. She reports having frequent fights with her family and feeling like her parents always take her sister's side as well. She endorses for the last 4-5wks low mood and anxiety, poor sleep and appetite, low energy, and then on Friday having SI for the first time. After having this thought, she told her mother and asked to be taken to the hospital. She denies ever feeling depressed or having SI previously. She denies HI, AVH, paranoia, or manic symptoms. She endorses smoking marijuana every night before bed for the last 2-3yrs.

## 2020-10-05 NOTE — PROGRESS NOTE BEHAVIORAL HEALTH - NSBHFUPIPCHARTREVFT_PSY_A_CORE
25 year old  female, with h/o depression, no prior psychiatric hospitalization, working for city form,  living with family, brought  to ED accompanied by her mother as patient text her friend making suicidal threats of killing her self  by overdose with pills.  mother reports that a month  ago there was family  fight and sine then, patient has been feeling depress, anxious. patient reports that she broke with her boy friend  about 2 weeks ago, and feels trap at  home , working from  home and   having  fights with her sister . She  reports  feeling very depress , hopeless , not sleeping , poor appetite,  having suicidal thoughts of overdose with pills, denies a/v hallucinations. She admits  smoking weeds  every night before she goes to bed.

## 2020-10-05 NOTE — DISCHARGE NOTE BEHAVIORAL HEALTH - NSBHDCSWCOMMENTSFT_PSY_A_CORE
Discharge Summary to [facility] Outpatient Mental Health Department on 10/05/2020 Discharge Summary Faxed to [facility] Outpatient Mental Health Department on 10/05/2020 at 1pm.

## 2020-10-05 NOTE — DISCHARGE NOTE BEHAVIORAL HEALTH - NSBHDCSUBSTHXFT_PSY_A_CORE
Patient endorses smoking "a couple hits" of marijuana every night before bed for the last 2-3yrs. Occasionally she will smoke more in a social setting, a couple times per month. Denies any other substance use.

## 2020-10-05 NOTE — PROGRESS NOTE BEHAVIORAL HEALTH - NSBHFUPINTERVALHXFT_PSY_A_CORE
chart reviewed. discussed with nursing staff and pt has been interviewed. pt is compliant with treatment. no prn medication received. pt ias given 72 hr notice but she wants to work with DR. she denies s/h ideations. no avh. no paranoia expressed.  this is a  25year old  female , with h/o depression , no prior psychiatric  hospitalization, works from home now a days. she is living with family ,  brought  to Ed accompanied by her mother  after pt's friend told that she was  making suicidal  threats of killing her self  by overdose with pills. as per record pt broke up with BF 2 wks ago. she felt trapped being not able to go out. felt depressed hopeless. having anxiety poor appetite not able to sleep well. She admits  smoking weeds  every night  makes her calm and helps her to sleep. discussed about different medications , anti anxiety, antidepressant ETC. pt agreed to take Buspiron three time a day and gradually to titrate up. if that does not work she would consider to take SSRI. she currently denies s/h ideations and she does not exhibit any psychotic symptoms. Discussed about 72 hrs letter. she wants to cooperates with 
Patient was seen and interviewed this morning. Chart was reviewed and no acute evens were reported overnight and no PRNs given.  Patient describes feeling "so much better" today compared to when she was first admitted. She identifies that living/working from home in close proximity to her family members was a stressor for her, and they were arguing more and more. She recalls following an argument on Friday with her father, having the thought of overdosing on her pills, which she has never thought before. Since then she denies any other thoughts of suicide, plan, or intent. She also denies any HI, A/V hallucinations, or paranoid thinking. She feels safe to return home at this time and has an appointment scheduled with her current therapist this evening. She describes her time on IPP as "flipping the switch," as in it allowed her to reset and gain some perspective. Buspar and Zoloft had been offered and ordered for the patient, but she has been only taking Buspar as she is only comfortable taking one medication at a time for now, and feels that her "anxiety is what led up the depressive symptoms." She has been taking the Buspar regularly and denies any adverse effects.      Spoke with mother, [phone], who reports patient seems "great now." She endorses patient is back to her baseline, and has no safety concerns with the patient coming home today.
luna reviewed. discussed with nursing staff and pt has been interviewed. this is a year old  female , with h/o depression , no prior psychiatric  hospitalization, works from home now [email or web address]. she is living with family ,  brought  to Ed accompanied by her mother  after pt's friend told that she was  making suicidal  threats of killing her self  by overdose with pills. as per record pt broke up with BF 2 wks ago. she felt trapped being not able to go out. felt depressed hopeless. having anxiety poor appetite not able to sleep well. She admits  smoking weeds  every night  makes her calm and helps her to sleep. discussed about different medications , anti anxiety, antidepressant ETC. pt agreed to take Buspiron three time a day and gradually to titrate up. if that does not work she would consider to take SSRI. she currently denies s/h ideations and she does not exhibit any psychotic symptoms. Discussed about 72 hrs letter. she wants to cooperates with

## 2020-10-05 NOTE — CHART NOTE - NSCHARTNOTEFT_GEN_A_CORE
Social Work Discharge Note:    Patient is for discharge today.   She is alert and oriented x3.  Mood is improved.  Anxiety has decreased.  Insight and judgment have improved.  Suicidal / homicidal ideation denied.      Patient will be discharged to her home in [place] with her family.  Plan is for referral to [facility] outpatient mental health [facility].  She is aware and agreeable to same.      [facility] [phone] provided as an additional resource.     Contact with patient’s mother occurred prior to discharge.  At that time no concerns were verbalized regarding patient’s return to the community.     Patient is aware and agreeable to discharge today.

## 2020-10-05 NOTE — PROGRESS NOTE BEHAVIORAL HEALTH - RISK ASSESSMENT
Modifiable risk factors include SI/I/P, psychiatric illness, recent psychosocial stressors, medical illness/pain, substance use/intoxication.  Protective factors include social support/family connectiveness, seeking out treatment/hopefullness.

## 2020-10-05 NOTE — DISCHARGE NOTE BEHAVIORAL HEALTH - PRINCIPAL DIAGNOSIS
Adjustment disorder with depressed mood Current mild episode of major depressive disorder without prior episode

## 2020-10-05 NOTE — PROGRESS NOTE BEHAVIORAL HEALTH - NSBHADDHXPSYSOCFT_PSY_A_CORE
Patient moved back in with family during COVID, and has been living with her two parents and sibling. 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 Recently had a breakup with her BF.

## 2020-10-05 NOTE — DISCHARGE NOTE BEHAVIORAL HEALTH - CARE PROVIDER_API CALL
[facility] Outpatient Mental Health [facility],   [address] [place] [address]  [id number]  Phone: [phone]  Fax: (   )    -  Follow Up Time:

## 2020-10-05 NOTE — DISCHARGE NOTE BEHAVIORAL HEALTH - NSBHDCSUICFCTROTHERFT_PSY_A_CORE
Worsening conditions at home, continued/increased substance use, and worsening medical conditions, noncompliance with outpatient medication and treatment.

## 2020-10-05 NOTE — DISCHARGE NOTE BEHAVIORAL HEALTH - HPI (INCLUDE ILLNESS QUALITY, SEVERITY, DURATION, TIMING, CONTEXT, MODIFYING FACTORS, ASSOCIATED SIGNS AND SYMPTOMS)
ED assessment:  25 year old  female, with h/o depression, no prior psychiatric hospitalization, working for city [employer],  living with family, brought  to ED accompanied by her mother as patient text her friend making suicidal threats of killing her self  by overdose with pills.  mother reports that a month  ago there was family  fight and sine then, patient has been feeling depress, anxious. patient reports that she broke with her boy friend  about 2 weeks ago, and feels trap at  home , working from  home and   having  fights with her sister . She  reports  feeling very depress , hopeless , not sleeping , poor appetite,  having suicidal thoughts of overdose with pills, denies a/v hallucinations. She admits  smoking weeds  every night before she goes to bed.    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 As per mother in the ED, patient texted her friend, making suicidal threats of killing herself by OD on pills. Patient reports recent breakup with BF 2wks ago, as well as moving back home to live with family during COVID and working from home, all of which have been recent stressors for her. She reports having frequent fights with her family and feeling like her parents always take her sister's side as well. She endorses for the last 4-5wks low mood and anxiety, poor sleep and appetite, low energy, and then on Friday having SI for the first time. After having this thought, she told her mother and asked to be taken to the hospital. She denies ever feeling depressed or having SI previously. She denies HI, AVH, paranoia, or manic symptoms. She endorses smoking marijuana every night before bed for the last 2-3yrs.

## 2020-10-05 NOTE — PROGRESS NOTE BEHAVIORAL HEALTH - NSBHCHARTREVIEWVS_PSY_A_CORE FT
Vital Signs Last 24 Hrs  T(C): 36.7 (05 Oct 2020 09:27), Max: 36.7 (05 Oct 2020 09:27)  T(F): 98 (05 Oct 2020 09:27), Max: 98 (05 Oct 2020 09:27)  HR: 79 (05 Oct 2020 09:27) (79 - 100)  BP: 108/71 (05 Oct 2020 09:27) (108/71 - 135/73)  BP(mean): --  RR: 18 (05 Oct 2020 09:27) (18 - 18)  SpO2: --

## 2020-10-05 NOTE — DISCHARGE NOTE BEHAVIORAL HEALTH - NSBHDCSUICFCTRSFT_PSY_A_CORE
Arguments with family and living at home again, daily marijuana use, multiple medical conditions (Hypothyroidism, PCOS, Endometriosis, MTHFR gene mutation), ADHD & dyslexia.

## 2020-10-05 NOTE — DISCHARGE NOTE BEHAVIORAL HEALTH - NSBHDCSUICFCTRMIT_PSY_A_CORE
Continuing therapy/outpatient psychiatric treatment to better cope with stressors, as well as regular medical follow up outpatient, and discontinuing use of marijuana.

## 2020-10-05 NOTE — PROGRESS NOTE BEHAVIORAL HEALTH - NSBHFUPSTRENGTHS_PSY_A_CORE
Has vocational interests or hobbies/Able to exercise self-direction/Has access to housing/residential stability/In good physical health/Has supportive interpersonal relationships with family, friends or peers/Good impulse control/Motivated and ready for change/Steady employment

## 2020-10-05 NOTE — DISCHARGE NOTE BEHAVIORAL HEALTH - NSBHDCCONDITIONFT_PSY_A_CORE
Pt was seen, evaluated, chart reviewed.  As per nursing staff, no events overnight. On evaluation, pt reports that she is doing well. Offered no new complaints. Is compliant with medication, denies negative side effects. Endorsed good sleep and appetite. Denied A/V hallucinations. Denied paranoia. Denied suicidal/homicidal ideation, intent or plan. Pt is for discharge today. Agreeable with outpatient follow up. no psychiatric contraindications to discharge

## 2020-10-09 ENCOUNTER — OUTPATIENT (OUTPATIENT)
Dept: OUTPATIENT SERVICES | Facility: HOSPITAL | Age: 25
LOS: 1 days | Discharge: HOME | End: 2020-10-09

## 2020-10-09 DIAGNOSIS — F32.9 MAJOR DEPRESSIVE DISORDER, SINGLE EPISODE, UNSPECIFIED: ICD-10-CM

## 2020-10-09 DIAGNOSIS — R45.851 SUICIDAL IDEATIONS: ICD-10-CM

## 2020-10-09 DIAGNOSIS — E72.12 METHYLENETETRAHYDROFOLATE REDUCTASE DEFICIENCY: ICD-10-CM

## 2020-10-09 DIAGNOSIS — F90.9 ATTENTION-DEFICIT HYPERACTIVITY DISORDER, UNSPECIFIED TYPE: ICD-10-CM

## 2020-10-09 DIAGNOSIS — F12.10 CANNABIS ABUSE, UNCOMPLICATED: ICD-10-CM

## 2020-10-09 DIAGNOSIS — E28.2 POLYCYSTIC OVARIAN SYNDROME: ICD-10-CM

## 2020-10-09 DIAGNOSIS — F17.210 NICOTINE DEPENDENCE, CIGARETTES, UNCOMPLICATED: ICD-10-CM

## 2020-10-09 DIAGNOSIS — Z88.1 ALLERGY STATUS TO OTHER ANTIBIOTIC AGENTS STATUS: ICD-10-CM

## 2020-10-09 DIAGNOSIS — R48.0 DYSLEXIA AND ALEXIA: ICD-10-CM

## 2020-10-09 DIAGNOSIS — Z91.013 ALLERGY TO SEAFOOD: ICD-10-CM

## 2020-10-09 DIAGNOSIS — E03.9 HYPOTHYROIDISM, UNSPECIFIED: ICD-10-CM

## 2020-10-09 DIAGNOSIS — F41.9 ANXIETY DISORDER, UNSPECIFIED: ICD-10-CM

## 2020-10-09 DIAGNOSIS — Z91.018 ALLERGY TO OTHER FOODS: ICD-10-CM

## 2020-10-14 DIAGNOSIS — F41.1 GENERALIZED ANXIETY DISORDER: ICD-10-CM

## 2020-10-14 DIAGNOSIS — F12.20 CANNABIS DEPENDENCE, UNCOMPLICATED: ICD-10-CM

## 2020-10-14 DIAGNOSIS — F43.21 ADJUSTMENT DISORDER WITH DEPRESSED MOOD: ICD-10-CM

## 2020-10-15 ENCOUNTER — OUTPATIENT (OUTPATIENT)
Dept: OUTPATIENT SERVICES | Facility: HOSPITAL | Age: 25
LOS: 1 days | Discharge: HOME | End: 2020-10-15

## 2020-10-15 DIAGNOSIS — F33.1 MAJOR DEPRESSIVE DISORDER, RECURRENT, MODERATE: ICD-10-CM

## 2020-10-27 ENCOUNTER — OUTPATIENT (OUTPATIENT)
Dept: OUTPATIENT SERVICES | Facility: HOSPITAL | Age: 25
LOS: 1 days | Discharge: HOME | End: 2020-10-27

## 2020-10-27 DIAGNOSIS — F33.1 MAJOR DEPRESSIVE DISORDER, RECURRENT, MODERATE: ICD-10-CM

## 2020-10-28 ENCOUNTER — OUTPATIENT (OUTPATIENT)
Dept: OUTPATIENT SERVICES | Facility: HOSPITAL | Age: 25
LOS: 1 days | Discharge: HOME | End: 2020-10-28

## 2020-10-28 DIAGNOSIS — F32.1 MAJOR DEPRESSIVE DISORDER, SINGLE EPISODE, MODERATE: ICD-10-CM

## 2020-11-30 ENCOUNTER — NON-APPOINTMENT (OUTPATIENT)
Age: 25
End: 2020-11-30

## 2020-11-30 ENCOUNTER — OUTPATIENT (OUTPATIENT)
Dept: OUTPATIENT SERVICES | Facility: HOSPITAL | Age: 25
LOS: 1 days | Discharge: HOME | End: 2020-11-30
Payer: COMMERCIAL

## 2020-11-30 ENCOUNTER — APPOINTMENT (OUTPATIENT)
Dept: PSYCHIATRY | Facility: CLINIC | Age: 25
End: 2020-11-30

## 2020-11-30 DIAGNOSIS — F32.1 MAJOR DEPRESSIVE DISORDER, SINGLE EPISODE, MODERATE: ICD-10-CM

## 2020-11-30 PROCEDURE — 99214 OFFICE O/P EST MOD 30 MIN: CPT

## 2020-12-28 ENCOUNTER — OUTPATIENT (OUTPATIENT)
Dept: OUTPATIENT SERVICES | Facility: HOSPITAL | Age: 25
LOS: 1 days | Discharge: HOME | End: 2020-12-28

## 2020-12-28 ENCOUNTER — APPOINTMENT (OUTPATIENT)
Dept: PSYCHIATRY | Facility: CLINIC | Age: 25
End: 2020-12-28
Payer: COMMERCIAL

## 2020-12-28 DIAGNOSIS — F33.42 MAJOR DEPRESSIVE DISORDER, RECURRENT, IN FULL REMISSION: ICD-10-CM

## 2020-12-28 PROCEDURE — 99214 OFFICE O/P EST MOD 30 MIN: CPT | Mod: 95

## 2021-01-25 ENCOUNTER — APPOINTMENT (OUTPATIENT)
Dept: PSYCHIATRY | Facility: CLINIC | Age: 26
End: 2021-01-25

## 2021-01-26 ENCOUNTER — APPOINTMENT (OUTPATIENT)
Dept: PSYCHIATRY | Facility: CLINIC | Age: 26
End: 2021-01-26
Payer: COMMERCIAL

## 2021-01-26 ENCOUNTER — OUTPATIENT (OUTPATIENT)
Dept: OUTPATIENT SERVICES | Facility: HOSPITAL | Age: 26
LOS: 1 days | Discharge: HOME | End: 2021-01-26

## 2021-01-26 DIAGNOSIS — F33.42 MAJOR DEPRESSIVE DISORDER, RECURRENT, IN FULL REMISSION: ICD-10-CM

## 2021-01-26 PROCEDURE — 99214 OFFICE O/P EST MOD 30 MIN: CPT

## 2021-02-23 ENCOUNTER — APPOINTMENT (OUTPATIENT)
Dept: PSYCHIATRY | Facility: CLINIC | Age: 26
End: 2021-02-23
Payer: COMMERCIAL

## 2021-02-23 ENCOUNTER — RX RENEWAL (OUTPATIENT)
Age: 26
End: 2021-02-23

## 2021-02-23 ENCOUNTER — OUTPATIENT (OUTPATIENT)
Dept: OUTPATIENT SERVICES | Facility: HOSPITAL | Age: 26
LOS: 1 days | Discharge: HOME | End: 2021-02-23

## 2021-02-23 DIAGNOSIS — F33.42 MAJOR DEPRESSIVE DISORDER, RECURRENT, IN FULL REMISSION: ICD-10-CM

## 2021-02-23 PROCEDURE — 99214 OFFICE O/P EST MOD 30 MIN: CPT

## 2021-02-24 ENCOUNTER — APPOINTMENT (OUTPATIENT)
Dept: PSYCHIATRY | Facility: CLINIC | Age: 26
End: 2021-02-24

## 2021-03-23 ENCOUNTER — APPOINTMENT (OUTPATIENT)
Dept: PSYCHIATRY | Facility: CLINIC | Age: 26
End: 2021-03-23
Payer: COMMERCIAL

## 2021-03-23 ENCOUNTER — OUTPATIENT (OUTPATIENT)
Dept: OUTPATIENT SERVICES | Facility: HOSPITAL | Age: 26
LOS: 1 days | Discharge: HOME | End: 2021-03-23

## 2021-03-23 DIAGNOSIS — F41.0 PANIC DISORDER [EPISODIC PAROXYSMAL ANXIETY]: ICD-10-CM

## 2021-03-23 PROCEDURE — 99214 OFFICE O/P EST MOD 30 MIN: CPT | Mod: 95

## 2021-09-28 ENCOUNTER — APPOINTMENT (OUTPATIENT)
Dept: PSYCHIATRY | Facility: CLINIC | Age: 26
End: 2021-09-28
Payer: COMMERCIAL

## 2021-09-28 ENCOUNTER — OUTPATIENT (OUTPATIENT)
Dept: OUTPATIENT SERVICES | Facility: HOSPITAL | Age: 26
LOS: 1 days | Discharge: HOME | End: 2021-09-28

## 2021-09-28 DIAGNOSIS — F41.0 PANIC DISORDER [EPISODIC PAROXYSMAL ANXIETY]: ICD-10-CM

## 2021-09-28 PROCEDURE — 99214 OFFICE O/P EST MOD 30 MIN: CPT | Mod: 95

## 2021-10-27 ENCOUNTER — OUTPATIENT (OUTPATIENT)
Dept: OUTPATIENT SERVICES | Facility: HOSPITAL | Age: 26
LOS: 1 days | Discharge: HOME | End: 2021-10-27

## 2021-10-27 ENCOUNTER — APPOINTMENT (OUTPATIENT)
Dept: PSYCHIATRY | Facility: CLINIC | Age: 26
End: 2021-10-27
Payer: COMMERCIAL

## 2021-10-27 DIAGNOSIS — F41.0 PANIC DISORDER [EPISODIC PAROXYSMAL ANXIETY]: ICD-10-CM

## 2021-10-27 PROCEDURE — 99214 OFFICE O/P EST MOD 30 MIN: CPT | Mod: 95

## 2022-02-08 ENCOUNTER — OUTPATIENT (OUTPATIENT)
Dept: OUTPATIENT SERVICES | Facility: HOSPITAL | Age: 27
LOS: 1 days | Discharge: HOME | End: 2022-02-08

## 2022-02-08 ENCOUNTER — APPOINTMENT (OUTPATIENT)
Dept: PSYCHIATRY | Facility: CLINIC | Age: 27
End: 2022-02-08
Payer: COMMERCIAL

## 2022-02-08 PROCEDURE — 99214 OFFICE O/P EST MOD 30 MIN: CPT | Mod: 95

## 2022-03-08 ENCOUNTER — APPOINTMENT (OUTPATIENT)
Dept: PSYCHIATRY | Facility: CLINIC | Age: 27
End: 2022-03-08
Payer: COMMERCIAL

## 2022-03-08 ENCOUNTER — OUTPATIENT (OUTPATIENT)
Dept: OUTPATIENT SERVICES | Facility: HOSPITAL | Age: 27
LOS: 1 days | Discharge: HOME | End: 2022-03-08

## 2022-03-08 DIAGNOSIS — F90.0 ATTENTION-DEFICIT HYPERACTIVITY DISORDER, PREDOMINANTLY INATTENTIVE TYPE: ICD-10-CM

## 2022-03-08 DIAGNOSIS — F33.1 MAJOR DEPRESSIVE DISORDER, RECURRENT, MODERATE: ICD-10-CM

## 2022-03-08 PROCEDURE — 99214 OFFICE O/P EST MOD 30 MIN: CPT | Mod: 95

## 2022-06-28 ENCOUNTER — OUTPATIENT (OUTPATIENT)
Dept: OUTPATIENT SERVICES | Facility: HOSPITAL | Age: 27
LOS: 1 days | Discharge: HOME | End: 2022-06-28

## 2022-06-28 ENCOUNTER — APPOINTMENT (OUTPATIENT)
Dept: PSYCHIATRY | Facility: CLINIC | Age: 27
End: 2022-06-28

## 2022-06-28 DIAGNOSIS — F33.41 MAJOR DEPRESSIVE DISORDER, RECURRENT, IN PARTIAL REMISSION: ICD-10-CM

## 2022-06-28 DIAGNOSIS — F41.0 PANIC DISORDER [EPISODIC PAROXYSMAL ANXIETY]: ICD-10-CM

## 2022-06-28 DIAGNOSIS — F90.0 ATTENTION-DEFICIT HYPERACTIVITY DISORDER, PREDOMINANTLY INATTENTIVE TYPE: ICD-10-CM

## 2022-06-28 PROCEDURE — 99214 OFFICE O/P EST MOD 30 MIN: CPT | Mod: 95

## 2022-07-26 ENCOUNTER — APPOINTMENT (OUTPATIENT)
Dept: PSYCHIATRY | Facility: CLINIC | Age: 27
End: 2022-07-26

## 2022-07-26 ENCOUNTER — OUTPATIENT (OUTPATIENT)
Dept: OUTPATIENT SERVICES | Facility: HOSPITAL | Age: 27
LOS: 1 days | Discharge: HOME | End: 2022-07-26

## 2022-07-26 DIAGNOSIS — F33.41 MAJOR DEPRESSIVE DISORDER, RECURRENT, IN PARTIAL REMISSION: ICD-10-CM

## 2022-07-26 DIAGNOSIS — F41.0 PANIC DISORDER [EPISODIC PAROXYSMAL ANXIETY]: ICD-10-CM

## 2022-07-26 DIAGNOSIS — F90.0 ATTENTION-DEFICIT HYPERACTIVITY DISORDER, PREDOMINANTLY INATTENTIVE TYPE: ICD-10-CM

## 2022-07-26 PROCEDURE — 99214 OFFICE O/P EST MOD 30 MIN: CPT | Mod: 95

## 2022-11-21 ENCOUNTER — OUTPATIENT (OUTPATIENT)
Dept: OUTPATIENT SERVICES | Facility: HOSPITAL | Age: 27
LOS: 1 days | Discharge: HOME | End: 2022-11-21

## 2022-11-21 ENCOUNTER — APPOINTMENT (OUTPATIENT)
Dept: PSYCHIATRY | Facility: CLINIC | Age: 27
End: 2022-11-21

## 2022-11-21 DIAGNOSIS — F33.42 MAJOR DEPRESSIVE DISORDER, RECURRENT, IN FULL REMISSION: ICD-10-CM

## 2022-11-21 DIAGNOSIS — F90.0 ATTENTION-DEFICIT HYPERACTIVITY DISORDER, PREDOMINANTLY INATTENTIVE TYPE: ICD-10-CM

## 2022-11-21 DIAGNOSIS — F41.0 PANIC DISORDER [EPISODIC PAROXYSMAL ANXIETY]: ICD-10-CM

## 2022-11-21 PROCEDURE — 99214 OFFICE O/P EST MOD 30 MIN: CPT | Mod: 95

## 2022-11-21 NOTE — HISTORY OF PRESENT ILLNESS
[de-identified] : she is doing quite well  taking Zoloft several weeks now. she started feeling better and not intensely anxious after learning that she can continue working remote until December. 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  she still has less difficulty focus, concentrate, attend to tasks after taking Adderall as needed and performance at work has improved significantly.   she feels independent and confident.  she sleeps well. she has good appetite.  she is in therapy and has made attempt to get her parents to go with her for family therapy to improve communication. she is developing anticipatory anxiety after she was told to return to work in person starting December 1st week. she is quite concerned about her fear of subway and agoraphobic tendencies. she is working with HR to receive accommodation.  [No] : no

## 2022-11-21 NOTE — PAST MEDICAL HISTORY
[FreeTextEntry1] : she has been dx major depression and has een hospitalized prior to clinic admission for depression and suicidality. she is stabilized on zoloft 50 mg po daily and xanax 0.25 mg \par po daily as needed.

## 2022-11-21 NOTE — DISCUSSION/SUMMARY
[FreeTextEntry1] : zoloft 50 mg po daily \par xanax 0.25 mg po qd prn\par adderall 5 mg po q d \par f/u 1 month.

## 2022-11-21 NOTE — REASON FOR VISIT
[Follow-Up Visit] : a follow-up visit [Other: _____] : [unfilled] [FreeTextEntry1] : medication management [Home] : at home, [unfilled] , at the time of the visit. [Medical Office: ([facility])___] : at the medical office located in  [Verbal consent obtained from patient] : the patient, [unfilled]

## 2023-02-06 NOTE — PATIENT PROFILE BEHAVIORAL HEALTH - NSBHATTSPAN_PSY_A_CORE
good Solaraze Counseling:  I discussed with the patient the risks of Solaraze including but not limited to erythema, scaling, itching, weeping, crusting, and pain.

## 2023-02-23 ENCOUNTER — OUTPATIENT (OUTPATIENT)
Dept: OUTPATIENT SERVICES | Facility: HOSPITAL | Age: 28
LOS: 1 days | End: 2023-02-23
Payer: COMMERCIAL

## 2023-02-23 ENCOUNTER — APPOINTMENT (OUTPATIENT)
Dept: PSYCHIATRY | Facility: CLINIC | Age: 28
End: 2023-02-23
Payer: COMMERCIAL

## 2023-02-23 DIAGNOSIS — F33.1 MAJOR DEPRESSIVE DISORDER, RECURRENT, MODERATE: ICD-10-CM

## 2023-02-23 DIAGNOSIS — F41.0 PANIC DISORDER [EPISODIC PAROXYSMAL ANXIETY]: ICD-10-CM

## 2023-02-23 DIAGNOSIS — F90.0 ATTENTION-DEFICIT HYPERACTIVITY DISORDER, PREDOMINANTLY INATTENTIVE TYPE: ICD-10-CM

## 2023-02-23 DIAGNOSIS — Z00.8 ENCOUNTER FOR OTHER GENERAL EXAMINATION: ICD-10-CM

## 2023-02-23 PROCEDURE — 99214 OFFICE O/P EST MOD 30 MIN: CPT | Mod: 95

## 2023-02-23 NOTE — HISTORY OF PRESENT ILLNESS
[de-identified] : she is doing quite well  and feeling better and not intensely anxious.  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  she has less difficulty focus, concentrate, attend to tasks after taking Adderall as needed and performance at work has improved significantly.   she feels independent and confident.  she sleeps well. she has good appetite.  she is in therapy and has made attempt to get her parents to go with her for family therapy to improve communication. she is developing anticipatory anxiety after she was told she may have to return to work in person. she is quite concerned about her fear of subway and agoraphobic tendencies. she is working with HR to receive accommodation.  [No] : no

## 2023-02-23 NOTE — REASON FOR VISIT
[Follow-Up Visit] : a follow-up visit [Other: _____] : [unfilled] [FreeTextEntry1] : medication management [Home] : at home, [unfilled] , at the time of the visit. [Medical Office: ([facility])___] : at the medical office located in  [Verbal consent obtained from patient] : the patient, [unfilled]

## 2023-06-22 ENCOUNTER — OUTPATIENT (OUTPATIENT)
Dept: OUTPATIENT SERVICES | Facility: HOSPITAL | Age: 28
LOS: 1 days | End: 2023-06-22
Payer: COMMERCIAL

## 2023-06-22 ENCOUNTER — APPOINTMENT (OUTPATIENT)
Dept: PSYCHIATRY | Facility: CLINIC | Age: 28
End: 2023-06-22
Payer: COMMERCIAL

## 2023-06-22 DIAGNOSIS — F33.1 MAJOR DEPRESSIVE DISORDER, RECURRENT, MODERATE: ICD-10-CM

## 2023-06-22 DIAGNOSIS — F90.0 ATTENTION-DEFICIT HYPERACTIVITY DISORDER, PREDOMINANTLY INATTENTIVE TYPE: ICD-10-CM

## 2023-06-22 DIAGNOSIS — F33.42 MAJOR DEPRESSIVE DISORDER, RECURRENT, IN FULL REMISSION: ICD-10-CM

## 2023-06-22 DIAGNOSIS — F41.0 PANIC DISORDER [EPISODIC PAROXYSMAL ANXIETY]: ICD-10-CM

## 2023-06-22 PROCEDURE — 99214 OFFICE O/P EST MOD 30 MIN: CPT | Mod: 95

## 2023-06-22 NOTE — PLAN
[No] : No [Medication education provided] : Medication education provided. [Rationale for medication choices, possible risks/precautions, benefits, alternative treatment choices, and consequences of non-treatment discussed] : Rationale for medication choices, possible risks/precautions, benefits, alternative treatment choices, and consequences of non-treatment discussed with patient/family/caregiver  [Order(s) for labs placed in [facility] EHR] : Labs [FreeTextEntry4] : maintain attention and functional stability\par manage anxiety [FreeTextEntry5] : d/c zoloft 50 mg po daily \par d/c xanax 0.25 mg po qd prn\par adderall 5 mg po q d prn\par f/u 3 month.

## 2023-06-22 NOTE — DISCUSSION/SUMMARY
[FreeTextEntry1] : d/c zoloft 50 mg po daily \par d/c xanax 0.25 mg po qd prn\par adderall 5 mg po q d prn\par f/u 3 month.

## 2023-06-22 NOTE — HISTORY OF PRESENT ILLNESS
[de-identified] : she is doing quite well  and feeling better and not intensely anxious.  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  she has less difficulty focus, concentrate, attend to tasks after taking Adderall as needed and performance at work has improved significantly.   she feels independent and confident.  she sleeps well. she has good appetite.  she is in therapy and has made attempt to get her parents to go with her for family therapy to improve communication. she is developing anticipatory anxiety after she was told she may have to return to work in person. she is quite concerned about her fear of subway and agoraphobic tendencies. she is working with HR to receive accommodation. she desires to come of medications . \par \par discussed about my longterm and transition plan. [No] : no

## 2023-06-23 DIAGNOSIS — F90.0 ATTENTION-DEFICIT HYPERACTIVITY DISORDER, PREDOMINANTLY INATTENTIVE TYPE: ICD-10-CM

## 2023-06-23 DIAGNOSIS — F33.42 MAJOR DEPRESSIVE DISORDER, RECURRENT, IN FULL REMISSION: ICD-10-CM

## 2023-06-27 NOTE — ED ADULT NURSE REASSESSMENT NOTE - NS ED NURSE REASSESS COMMENT FT1
Assumed care from previous primary day shift nurse Carmen , pt on constant observation c /o suicidal thoughts . Pt AO x 4 , admits having thoughts / plans of hurting self by possibly taking a lot of pills . pt denies any thoughts of hurting others . Pts mother at the bedside . PCA at the bedside . Frequent monitoring done . Provided safety . Pt anxious . Provided emotional support . Denies auditory and visual hallucination . Pt denies chest pain , denies abdominal pain , denies dizziness , denies nausea , no vomiting noted , denies dysuria , denies back pain , ambulates with steady gait , no cough , no SOB , continue 1:1 as ordered Stable on Lisinopril.

## 2023-07-17 RX ORDER — ALPRAZOLAM 0.25 MG/1
0.25 TABLET ORAL
Qty: 30 | Refills: 0 | Status: DISCONTINUED | COMMUNITY
Start: 2020-12-28 | End: 2023-07-17

## 2023-07-17 RX ORDER — SERTRALINE HYDROCHLORIDE 50 MG/1
50 TABLET, FILM COATED ORAL DAILY
Qty: 30 | Refills: 2 | Status: DISCONTINUED | COMMUNITY
Start: 2020-12-28 | End: 2023-07-17

## 2023-07-24 NOTE — H&P ADULT - GENITOURINARY
[facility]  [facility] [place] - [phone]    Your Team Members:  Advanced Practitioner:  Janet Ortiz PA-C  Oncology Nurse:   Jian Posada RN ([phone]) M-F 8am - 4:30pm    Please answer Private and Unavailable Calls - this may be your team(s) contacting you. I  If you have medical questions/concerns/issues - contact us either by (1) My Chart (2) Hope Line    Call if you become pregnant. You cannot continue IV iron in the first trimester.
negative

## 2023-08-03 ENCOUNTER — OUTPATIENT (OUTPATIENT)
Dept: OUTPATIENT SERVICES | Facility: HOSPITAL | Age: 28
LOS: 1 days | End: 2023-08-03

## 2023-08-03 DIAGNOSIS — F43.12 POST-TRAUMATIC STRESS DISORDER, CHRONIC: ICD-10-CM

## 2023-08-04 DIAGNOSIS — F43.12 POST-TRAUMATIC STRESS DISORDER, CHRONIC: ICD-10-CM

## 2023-08-10 ENCOUNTER — NON-APPOINTMENT (OUTPATIENT)
Age: 28
End: 2023-08-10

## 2023-08-16 NOTE — REASON FOR VISIT
How Severe Is Your Skin Lesion?: mild Has Your Skin Lesion Been Treated?: not been treated Is This A New Presentation, Or A Follow-Up?: Skin Lesion [Follow-Up Visit] : a follow-up visit [Other: _____] : [unfilled] [FreeTextEntry1] : medication management [Home] : at home, [unfilled] , at the time of the visit. [Medical Office: ([facility])___] : at the medical office located in  [Verbal consent obtained from patient] : the patient, [unfilled]

## 2023-08-31 ENCOUNTER — OUTPATIENT (OUTPATIENT)
Dept: OUTPATIENT SERVICES | Facility: HOSPITAL | Age: 28
LOS: 1 days | End: 2023-08-31
Payer: COMMERCIAL

## 2023-08-31 ENCOUNTER — APPOINTMENT (OUTPATIENT)
Dept: PSYCHIATRY | Facility: CLINIC | Age: 28
End: 2023-08-31
Payer: COMMERCIAL

## 2023-08-31 DIAGNOSIS — F33.1 MAJOR DEPRESSIVE DISORDER, RECURRENT, MODERATE: ICD-10-CM

## 2023-08-31 PROCEDURE — 99214 OFFICE O/P EST MOD 30 MIN: CPT | Mod: 95

## 2023-08-31 NOTE — DISCUSSION/SUMMARY
[FreeTextEntry1] : Pt presents with ongoing anxiety symptoms, agoraphobia. Pt also reports sensitivity to sounds/lights/touch due to mesophonia and ADHD hx. Will increase Zoloft at this time.   PLAN 1) INCREASE Zoloft to 50mg qdaily for tx of anxiety 2) Continue Propranolol 10mg BID PRN for tx of panic attacks/breakthrough anxiety 3) Continue Adderall 5mg PO qd PRN for ADHD (pt uses it 1 to 2x weekly, no script sent today) 4) Follow up in 1 month 5) Pt to appeal reasonable accommodation denial. Provider to review forms and provide additional documentation

## 2023-08-31 NOTE — HISTORY OF PRESENT ILLNESS
[No] : no [de-identified] : Pt states she called in sick today due to crippling anxiety. Pt states her reasonable request accommodation was denied by her employer and pt was scheduled to go in person today for work. Pt reports feeling "panicked". Pt states her therapy session earlier was helpful. Pt states she might do an appeal process or take FMLA. Pt reports feeling anxious about being in an office with "sounds that I can't control". Pt states she has used Propranolol 1 to 2x weekly on average. Pt states she uses Adderall once a week with "really hard ADHD tasks". Pt denies any active or passive SI/Hi/AVH. Pt has been compliant with Zoloft.

## 2023-08-31 NOTE — PLAN
[No] : No [Medication education provided] : Medication education provided. [Rationale for medication choices, possible risks/precautions, benefits, alternative treatment choices, and consequences of non-treatment discussed] : Rationale for medication choices, possible risks/precautions, benefits, alternative treatment choices, and consequences of non-treatment discussed with patient/family/caregiver  [Order(s) for labs placed in [facility] EHR] : Labs [FreeTextEntry4] : maintain attention and functional stability\par  manage anxiety

## 2023-08-31 NOTE — REASON FOR VISIT
[Follow-Up Visit] : a follow-up visit [Other: _____] : [unfilled] [Home] : at home, [unfilled] , at the time of the visit. [Medical Office: ([facility])___] : at the medical office located in  [Verbal consent obtained from patient] : the patient, [unfilled] [FreeTextEntry1] : medication management

## 2023-09-01 DIAGNOSIS — F33.1 MAJOR DEPRESSIVE DISORDER, RECURRENT, MODERATE: ICD-10-CM

## 2023-09-05 ENCOUNTER — APPOINTMENT (OUTPATIENT)
Dept: PSYCHIATRY | Facility: CLINIC | Age: 28
End: 2023-09-05

## 2023-09-25 ENCOUNTER — OUTPATIENT (OUTPATIENT)
Dept: OUTPATIENT SERVICES | Facility: HOSPITAL | Age: 28
LOS: 1 days | End: 2023-09-25
Payer: COMMERCIAL

## 2023-09-25 ENCOUNTER — APPOINTMENT (OUTPATIENT)
Dept: PSYCHIATRY | Facility: CLINIC | Age: 28
End: 2023-09-25
Payer: COMMERCIAL

## 2023-09-25 DIAGNOSIS — F33.1 MAJOR DEPRESSIVE DISORDER, RECURRENT, MODERATE: ICD-10-CM

## 2023-09-25 PROCEDURE — 99214 OFFICE O/P EST MOD 30 MIN: CPT | Mod: 95

## 2023-09-25 RX ORDER — DEXTROAMPHETAMINE SACCHARATE, AMPHETAMINE ASPARTATE, DEXTROAMPHETAMINE SULFATE AND AMPHETAMINE SULFATE 1.25; 1.25; 1.25; 1.25 MG/1; MG/1; MG/1; MG/1
5 TABLET ORAL
Qty: 30 | Refills: 0 | Status: DISCONTINUED | COMMUNITY
Start: 2022-03-08 | End: 2023-09-25

## 2023-09-26 DIAGNOSIS — F33.1 MAJOR DEPRESSIVE DISORDER, RECURRENT, MODERATE: ICD-10-CM

## 2023-09-28 ENCOUNTER — APPOINTMENT (OUTPATIENT)
Dept: PSYCHIATRY | Facility: CLINIC | Age: 28
End: 2023-09-28

## 2023-10-09 ENCOUNTER — APPOINTMENT (OUTPATIENT)
Dept: PSYCHIATRY | Facility: CLINIC | Age: 28
End: 2023-10-09
Payer: COMMERCIAL

## 2023-10-09 ENCOUNTER — OUTPATIENT (OUTPATIENT)
Dept: OUTPATIENT SERVICES | Facility: HOSPITAL | Age: 28
LOS: 1 days | End: 2023-10-09
Payer: COMMERCIAL

## 2023-10-09 DIAGNOSIS — F33.1 MAJOR DEPRESSIVE DISORDER, RECURRENT, MODERATE: ICD-10-CM

## 2023-10-09 PROCEDURE — 99214 OFFICE O/P EST MOD 30 MIN: CPT | Mod: 95

## 2023-10-10 DIAGNOSIS — F33.1 MAJOR DEPRESSIVE DISORDER, RECURRENT, MODERATE: ICD-10-CM

## 2023-11-09 ENCOUNTER — OUTPATIENT (OUTPATIENT)
Dept: OUTPATIENT SERVICES | Facility: HOSPITAL | Age: 28
LOS: 1 days | End: 2023-11-09
Payer: COMMERCIAL

## 2023-11-09 ENCOUNTER — APPOINTMENT (OUTPATIENT)
Dept: PSYCHIATRY | Facility: CLINIC | Age: 28
End: 2023-11-09
Payer: COMMERCIAL

## 2023-11-09 DIAGNOSIS — F33.1 MAJOR DEPRESSIVE DISORDER, RECURRENT, MODERATE: ICD-10-CM

## 2023-11-09 PROCEDURE — 99213 OFFICE O/P EST LOW 20 MIN: CPT | Mod: 95

## 2023-11-09 RX ORDER — LISDEXAMFETAMINE 30 MG/1
30 CAPSULE ORAL DAILY
Qty: 30 | Refills: 0 | Status: COMPLETED | COMMUNITY
Start: 2023-09-25 | End: 2023-11-09

## 2023-11-09 RX ORDER — TRAZODONE HYDROCHLORIDE 50 MG/1
50 TABLET ORAL
Qty: 30 | Refills: 0 | Status: COMPLETED | COMMUNITY
Start: 2023-09-28 | End: 2023-11-09

## 2023-11-10 DIAGNOSIS — F33.1 MAJOR DEPRESSIVE DISORDER, RECURRENT, MODERATE: ICD-10-CM

## 2023-12-07 ENCOUNTER — OUTPATIENT (OUTPATIENT)
Dept: OUTPATIENT SERVICES | Facility: HOSPITAL | Age: 28
LOS: 1 days | End: 2023-12-07
Payer: COMMERCIAL

## 2023-12-07 ENCOUNTER — APPOINTMENT (OUTPATIENT)
Dept: PSYCHIATRY | Facility: CLINIC | Age: 28
End: 2023-12-07
Payer: COMMERCIAL

## 2023-12-07 DIAGNOSIS — F33.42 MAJOR DEPRESSIVE DISORDER, RECURRENT, IN FULL REMISSION: ICD-10-CM

## 2023-12-07 DIAGNOSIS — F32.4 MAJOR DEPRESSIVE DISORDER, SINGLE EPISODE, IN PARTIAL REMISSION: ICD-10-CM

## 2023-12-07 DIAGNOSIS — F90.0 ATTENTION-DEFICIT HYPERACTIVITY DISORDER, PREDOMINANTLY INATTENTIVE TYPE: ICD-10-CM

## 2023-12-07 PROCEDURE — 99213 OFFICE O/P EST LOW 20 MIN: CPT | Mod: 95

## 2023-12-08 DIAGNOSIS — F33.42 MAJOR DEPRESSIVE DISORDER, RECURRENT, IN FULL REMISSION: ICD-10-CM

## 2023-12-08 DIAGNOSIS — F90.0 ATTENTION-DEFICIT HYPERACTIVITY DISORDER, PREDOMINANTLY INATTENTIVE TYPE: ICD-10-CM

## 2024-01-25 ENCOUNTER — OUTPATIENT (OUTPATIENT)
Dept: OUTPATIENT SERVICES | Facility: HOSPITAL | Age: 29
LOS: 1 days | End: 2024-01-25
Payer: COMMERCIAL

## 2024-01-25 ENCOUNTER — APPOINTMENT (OUTPATIENT)
Dept: PSYCHIATRY | Facility: CLINIC | Age: 29
End: 2024-01-25
Payer: COMMERCIAL

## 2024-01-25 DIAGNOSIS — F32.4 MAJOR DEPRESSIVE DISORDER, SINGLE EPISODE, IN PARTIAL REMISSION: ICD-10-CM

## 2024-01-25 DIAGNOSIS — F41.0 PANIC DISORDER [EPISODIC PAROXYSMAL ANXIETY]: ICD-10-CM

## 2024-01-25 DIAGNOSIS — F90.0 ATTENTION-DEFICIT HYPERACTIVITY DISORDER, PREDOMINANTLY INATTENTIVE TYPE: ICD-10-CM

## 2024-01-25 PROCEDURE — 99213 OFFICE O/P EST LOW 20 MIN: CPT | Mod: 95

## 2024-01-25 RX ORDER — PROPRANOLOL HYDROCHLORIDE 10 MG/1
10 TABLET ORAL
Qty: 60 | Refills: 0 | Status: ACTIVE | COMMUNITY
Start: 2023-08-31 | End: 1900-01-01

## 2024-01-26 DIAGNOSIS — F41.0 PANIC DISORDER [EPISODIC PAROXYSMAL ANXIETY]: ICD-10-CM

## 2024-01-26 DIAGNOSIS — F32.4 MAJOR DEPRESSIVE DISORDER, SINGLE EPISODE, IN PARTIAL REMISSION: ICD-10-CM

## 2024-01-26 DIAGNOSIS — F90.0 ATTENTION-DEFICIT HYPERACTIVITY DISORDER, PREDOMINANTLY INATTENTIVE TYPE: ICD-10-CM

## 2024-02-29 ENCOUNTER — OUTPATIENT (OUTPATIENT)
Dept: OUTPATIENT SERVICES | Facility: HOSPITAL | Age: 29
LOS: 1 days | End: 2024-02-29
Payer: COMMERCIAL

## 2024-02-29 ENCOUNTER — APPOINTMENT (OUTPATIENT)
Dept: PSYCHIATRY | Facility: CLINIC | Age: 29
End: 2024-02-29
Payer: COMMERCIAL

## 2024-02-29 DIAGNOSIS — F41.0 PANIC DISORDER [EPISODIC PAROXYSMAL ANXIETY]: ICD-10-CM

## 2024-02-29 DIAGNOSIS — F90.0 ATTENTION-DEFICIT HYPERACTIVITY DISORDER, PREDOMINANTLY INATTENTIVE TYPE: ICD-10-CM

## 2024-02-29 DIAGNOSIS — F32.4 MAJOR DEPRESSIVE DISORDER, SINGLE EPISODE, IN PARTIAL REMISSION: ICD-10-CM

## 2024-02-29 PROCEDURE — 99213 OFFICE O/P EST LOW 20 MIN: CPT | Mod: 95

## 2024-02-29 NOTE — HISTORY OF PRESENT ILLNESS
[de-identified] : Pt reports stable mood symptoms. Pt states she came off birth control that she has been on for 9 years. Pt reports some excessive sweating on days when she uses Vyvanse. Pt reports continued benefit on executive functioning with Vyvanse.  Pt denies any active or passive SI/HI/AVH at this time. Pt reports anxiety has been under control with use of Sertraline and has not needed to use Propranolol PRN this month for breakthrough anxiety.    [No] : no

## 2024-02-29 NOTE — DISCUSSION/SUMMARY
[FreeTextEntry1] : Pt presents with stable mood symptoms.  Pt report she will take a drug holiday on weekends with regard to Vyvanse due to side effects of increased sweating. Continue all meds due to benefit. Pt in agreement.    PLAN 1) Continue Zoloft  75mg qdaily for tx of anxiety 2) Continue Propranolol 10mg BID PRN for tx of panic attacks/breakthrough anxiety 4) Continue Vyvanse  20mg qAM for ADHD 5) Follow up in 4 weeks

## 2024-02-29 NOTE — PLAN
[No] : No [Medication education provided] : Medication education provided. [Rationale for medication choices, possible risks/precautions, benefits, alternative treatment choices, and consequences of non-treatment discussed] : Rationale for medication choices, possible risks/precautions, benefits, alternative treatment choices, and consequences of non-treatment discussed with patient/family/caregiver  [FreeTextEntry4] : maintain attention and functional stability\par  manage anxiety

## 2024-03-01 DIAGNOSIS — F41.0 PANIC DISORDER [EPISODIC PAROXYSMAL ANXIETY]: ICD-10-CM

## 2024-03-01 DIAGNOSIS — F90.0 ATTENTION-DEFICIT HYPERACTIVITY DISORDER, PREDOMINANTLY INATTENTIVE TYPE: ICD-10-CM

## 2024-03-01 DIAGNOSIS — F32.4 MAJOR DEPRESSIVE DISORDER, SINGLE EPISODE, IN PARTIAL REMISSION: ICD-10-CM

## 2024-04-04 ENCOUNTER — OUTPATIENT (OUTPATIENT)
Dept: OUTPATIENT SERVICES | Facility: HOSPITAL | Age: 29
LOS: 1 days | End: 2024-04-04
Payer: COMMERCIAL

## 2024-04-04 ENCOUNTER — APPOINTMENT (OUTPATIENT)
Dept: PSYCHIATRY | Facility: CLINIC | Age: 29
End: 2024-04-04
Payer: COMMERCIAL

## 2024-04-04 DIAGNOSIS — F32.4 MAJOR DEPRESSIVE DISORDER, SINGLE EPISODE, IN PARTIAL REMISSION: ICD-10-CM

## 2024-04-04 DIAGNOSIS — F90.0 ATTENTION-DEFICIT HYPERACTIVITY DISORDER, PREDOMINANTLY INATTENTIVE TYPE: ICD-10-CM

## 2024-04-04 PROCEDURE — 99213 OFFICE O/P EST LOW 20 MIN: CPT | Mod: 95

## 2024-04-04 NOTE — DISCUSSION/SUMMARY
[FreeTextEntry1] : Pt presents with stable mood symptoms.  Pt only uses Vyvanse three days a week and has not needed to use Propranolol for breakthrough anxiety in the past few months. Pt reports she would like to consider tapering Zoloft at next visit due to her stable mood. Will continue current med regimen at this time due to benefit.  PLAN 1) Continue Zoloft  75mg qdaily for tx of anxiety 2) Continue Propranolol 10mg BID PRN for tx of panic attacks/breakthrough anxiety, no script needed at this time 4) Continue Vyvanse 20mg qAM for ADHD 5) Follow up in 6 weeks

## 2024-04-04 NOTE — HISTORY OF PRESENT ILLNESS
[de-identified] : Pt reports stable mood symptoms. Pt is cooperative, pleasant with good eye contact. Pt reports she has noticed improvement in her mood since stopping her birth control. Pt reports some excessive sweating on days when she uses Vyvanse and thus only uses it three days a week.  Pt reports continued benefit on executive functioning with Vyvanse.  Pt denies any active or passive SI/HI/AVH at this time. Pt reports anxiety has been under control with use of Sertraline and has not needed to use Propranolol PRN this month for breakthrough anxiety.    [No] : no

## 2024-04-05 DIAGNOSIS — F32.4 MAJOR DEPRESSIVE DISORDER, SINGLE EPISODE, IN PARTIAL REMISSION: ICD-10-CM

## 2024-04-05 DIAGNOSIS — F90.0 ATTENTION-DEFICIT HYPERACTIVITY DISORDER, PREDOMINANTLY INATTENTIVE TYPE: ICD-10-CM

## 2024-05-16 ENCOUNTER — APPOINTMENT (OUTPATIENT)
Dept: PSYCHIATRY | Facility: CLINIC | Age: 29
End: 2024-05-16
Payer: COMMERCIAL

## 2024-05-16 ENCOUNTER — OUTPATIENT (OUTPATIENT)
Dept: OUTPATIENT SERVICES | Facility: HOSPITAL | Age: 29
LOS: 1 days | End: 2024-05-16
Payer: COMMERCIAL

## 2024-05-16 DIAGNOSIS — F41.0 PANIC DISORDER [EPISODIC PAROXYSMAL ANXIETY]: ICD-10-CM

## 2024-05-16 DIAGNOSIS — F90.0 ATTENTION-DEFICIT HYPERACTIVITY DISORDER, PREDOMINANTLY INATTENTIVE TYPE: ICD-10-CM

## 2024-05-16 DIAGNOSIS — F32.4 MAJOR DEPRESSIVE DISORDER, SINGLE EPISODE, IN PARTIAL REMISSION: ICD-10-CM

## 2024-05-16 PROCEDURE — 99214 OFFICE O/P EST MOD 30 MIN: CPT | Mod: 95

## 2024-05-16 RX ORDER — SERTRALINE HYDROCHLORIDE 50 MG/1
50 TABLET, FILM COATED ORAL DAILY
Qty: 30 | Refills: 2 | Status: ACTIVE | COMMUNITY
Start: 2023-08-22 | End: 1900-01-01

## 2024-05-16 RX ORDER — LISDEXAMFETAMINE 20 MG/1
20 CAPSULE ORAL DAILY
Qty: 30 | Refills: 0 | Status: ACTIVE | COMMUNITY
Start: 2023-10-09 | End: 1900-01-01

## 2024-05-16 NOTE — DISCUSSION/SUMMARY
[FreeTextEntry1] : Pt presents with stable mood symptoms. As pt's anxiety has been stable for sufficient time, pt requests to taper Zoloft at this time. Risks/benefits of taper were discussed and pt expressed understanding. Will reduce Zoloft today.   PLAN 1) Taper Zoloft tp 50mg qdaily for tx of anxiety 2) Continue Propranolol 10mg BID PRN for tx of panic attacks/breakthrough anxiety, no script needed at this time as pt uses it very rarely and has previous supply 4) Continue Vyvanse 20mg qAM for ADHD, #30 tabs sent today 5/16. Pt instructed to call covering provider to renew script while provider is out of office in June/July.  Pt in agreement 5) Follow up in 3 months

## 2024-05-16 NOTE — HISTORY OF PRESENT ILLNESS
[de-identified] : Pt reports stable mood symptoms. Pt is cooperative, pleasant with good eye contact. Pt states she has been doing well at work.  Pt reports some excessive sweating on days when she uses Vyvanse and thus only uses it three days a week.  Pt reports continued benefit on executive functioning with Vyvanse when she does use it.  Pt denies any active or passive SI/HI/AVH at this time. Pt reports anxiety has been under control with use of Sertraline and has not needed to use Propranolol PRN this month for breakthrough anxiety. Pt cites goals of wanting to learn Croatian.  [No] : no

## 2024-05-17 DIAGNOSIS — F32.4 MAJOR DEPRESSIVE DISORDER, SINGLE EPISODE, IN PARTIAL REMISSION: ICD-10-CM

## 2024-05-17 DIAGNOSIS — F90.0 ATTENTION-DEFICIT HYPERACTIVITY DISORDER, PREDOMINANTLY INATTENTIVE TYPE: ICD-10-CM

## 2024-05-17 DIAGNOSIS — F41.0 PANIC DISORDER [EPISODIC PAROXYSMAL ANXIETY]: ICD-10-CM

## 2024-08-08 ENCOUNTER — OUTPATIENT (OUTPATIENT)
Dept: OUTPATIENT SERVICES | Facility: HOSPITAL | Age: 29
LOS: 1 days | End: 2024-08-08
Payer: COMMERCIAL

## 2024-08-08 ENCOUNTER — APPOINTMENT (OUTPATIENT)
Dept: PSYCHIATRY | Facility: CLINIC | Age: 29
End: 2024-08-08

## 2024-08-08 DIAGNOSIS — F90.0 ATTENTION-DEFICIT HYPERACTIVITY DISORDER, PREDOMINANTLY INATTENTIVE TYPE: ICD-10-CM

## 2024-08-08 DIAGNOSIS — F32.4 MAJOR DEPRESSIVE DISORDER, SINGLE EPISODE, IN PARTIAL REMISSION: ICD-10-CM

## 2024-08-08 DIAGNOSIS — F41.0 PANIC DISORDER [EPISODIC PAROXYSMAL ANXIETY]: ICD-10-CM

## 2024-08-08 PROCEDURE — 99214 OFFICE O/P EST MOD 30 MIN: CPT | Mod: 95

## 2024-08-08 NOTE — HISTORY OF PRESENT ILLNESS
[No] : no [de-identified] : Pt reports stable mood symptoms. Pt is cooperative, pleasant with good eye contact. Pt states she has been doing well at work and enjoying summer.  Pt reports due to the excessive heat, she has not been taking her Vyvanse medication this past month as pt has side effects of sweating with it. Pt report Pt reports continued benefit on executive functioning with Vyvanse when she does use it.  Pt denies any active or passive SI/HI/AVH at this time. Pt reports continued remission of anxiety since tapering Sertraline and is ready to discontinue it. Pt has not needed to use Propranolol PRN this month for breakthrough anxiety.

## 2024-08-08 NOTE — DISCUSSION/SUMMARY
[FreeTextEntry1] : Pt denies any worsening of mood since tapering Zoloft. Will d/c Zoloft today, continue all other meds due to benefit.    PLAN 1) D/C Zoloft  50mg qdaily  2) Continue Propranolol 10mg BID PRN for tx of panic attacks/breakthrough anxiety, no script needed at this time as pt uses it very rarely and has previous supply 4) Continue Vyvanse 20mg qAM for ADHD, no script sent today as pt has previous supply  5) Follow up in 6 weeks

## 2024-08-09 DIAGNOSIS — F32.4 MAJOR DEPRESSIVE DISORDER, SINGLE EPISODE, IN PARTIAL REMISSION: ICD-10-CM

## 2024-08-09 DIAGNOSIS — F41.0 PANIC DISORDER [EPISODIC PAROXYSMAL ANXIETY]: ICD-10-CM

## 2024-08-09 DIAGNOSIS — F90.0 ATTENTION-DEFICIT HYPERACTIVITY DISORDER, PREDOMINANTLY INATTENTIVE TYPE: ICD-10-CM

## 2024-09-19 ENCOUNTER — APPOINTMENT (OUTPATIENT)
Dept: PSYCHIATRY | Facility: CLINIC | Age: 29
End: 2024-09-19

## 2025-07-07 ENCOUNTER — NON-APPOINTMENT (OUTPATIENT)
Age: 30
End: 2025-07-07

## 2025-07-07 ENCOUNTER — APPOINTMENT (OUTPATIENT)
Dept: CARDIOLOGY | Facility: CLINIC | Age: 30
End: 2025-07-07
Payer: COMMERCIAL

## 2025-07-07 VITALS
WEIGHT: 200 LBS | OXYGEN SATURATION: 99 % | HEIGHT: 70 IN | BODY MASS INDEX: 28.63 KG/M2 | HEART RATE: 93 BPM | SYSTOLIC BLOOD PRESSURE: 118 MMHG | DIASTOLIC BLOOD PRESSURE: 80 MMHG

## 2025-07-07 PROBLEM — R55 NEAR SYNCOPE: Status: ACTIVE | Noted: 2025-07-07

## 2025-07-07 PROBLEM — R00.2 PALPITATIONS: Status: ACTIVE | Noted: 2025-07-07

## 2025-07-07 PROCEDURE — 93000 ELECTROCARDIOGRAM COMPLETE: CPT

## 2025-07-07 PROCEDURE — 99204 OFFICE O/P NEW MOD 45 MIN: CPT | Mod: 25

## 2025-07-22 ENCOUNTER — NON-APPOINTMENT (OUTPATIENT)
Age: 30
End: 2025-07-22

## 2025-08-06 ENCOUNTER — APPOINTMENT (OUTPATIENT)
Dept: CARDIOLOGY | Facility: CLINIC | Age: 30
End: 2025-08-06

## 2025-09-04 ENCOUNTER — APPOINTMENT (OUTPATIENT)
Dept: CARDIOLOGY | Facility: CLINIC | Age: 30
End: 2025-09-04